# Patient Record
Sex: MALE | Race: WHITE | Employment: OTHER | URBAN - METROPOLITAN AREA
[De-identification: names, ages, dates, MRNs, and addresses within clinical notes are randomized per-mention and may not be internally consistent; named-entity substitution may affect disease eponyms.]

---

## 2018-02-20 ENCOUNTER — OFFICE VISIT (OUTPATIENT)
Dept: OPHTHALMOLOGY | Facility: CLINIC | Age: 58
End: 2018-02-20
Attending: OPHTHALMOLOGY
Payer: COMMERCIAL

## 2018-02-20 DIAGNOSIS — Z98.890 H/O LASER ASSISTED IN SITU KERATOMILEUSIS: ICD-10-CM

## 2018-02-20 DIAGNOSIS — H17.9 CORNEAL SCAR, RIGHT EYE: ICD-10-CM

## 2018-02-20 DIAGNOSIS — H25.13 AGE-RELATED NUCLEAR CATARACT OF BOTH EYES: ICD-10-CM

## 2018-02-20 DIAGNOSIS — H04.123 DRY EYES, BILATERAL: ICD-10-CM

## 2018-02-20 DIAGNOSIS — E08.3293 MILD NONPROLIFERATIVE DIABETIC RETINOPATHY OF BOTH EYES WITHOUT MACULAR EDEMA ASSOCIATED WITH DIABETES MELLITUS DUE TO UNDERLYING CONDITION (H): Primary | ICD-10-CM

## 2018-02-20 PROCEDURE — G0463 HOSPITAL OUTPT CLINIC VISIT: HCPCS | Mod: ZF

## 2018-02-20 ASSESSMENT — EXTERNAL EXAM - LEFT EYE: OS_EXAM: NORMAL

## 2018-02-20 ASSESSMENT — VISUAL ACUITY
METHOD: SNELLEN - LINEAR
OS_SC+: -2
OS_SC: 20/30
OD_PH_SC: 20/25-2
OS_PH_SC: 20/25+1
OD_SC: 20/40
OD_SC+: -2

## 2018-02-20 ASSESSMENT — SLIT LAMP EXAM - LIDS
COMMENTS: NORMAL
COMMENTS: NORMAL

## 2018-02-20 ASSESSMENT — TONOMETRY
IOP_METHOD: TONOPEN
OS_IOP_MMHG: 18
OD_IOP_MMHG: 16

## 2018-02-20 ASSESSMENT — EXTERNAL EXAM - RIGHT EYE: OD_EXAM: NORMAL

## 2018-02-20 ASSESSMENT — CONF VISUAL FIELD
OD_NORMAL: 1
OS_NORMAL: 1

## 2018-02-20 ASSESSMENT — CUP TO DISC RATIO
OD_RATIO: 0.4
OS_RATIO: 0.35

## 2018-02-20 NOTE — LETTER
2/20/2018       RE: Maurice Quiñones  [address] [facility]  [place] [address]     Dear Colleague,    Thank you for referring your patient, Maurice Quiñones, to the EYE CLINIC at [facility]. Please see a copy of my visit note below.    HPI  Maurice Quiñones is a 58 year old male with h/o laser in situ keratomileusis (LASIK) both eyes ~5-6 years ago and diabetes mellitus II here for diabetic annual eye exam. States that his vision changes with elevations in his blood sugar. No flashes/floaters.     Uses AT ointment at night as needed.    Medical History:  Type II DM    Ocular History:  laser in situ keratomileusis (LASIK) ~5-6 years ago both eyes    Assessment & Plan    1. Diabetes Mellitus II   Most recent A1c was 9.2 11/2017   Has mild nonproliferative diabetic retinopathy both eyes   On insulin and metformin   Recommend good blood sugar, pressure, lipid control    2. Refractive error   Patient has mild myopia and astigmatism both eyes   He is satisfied with 20/40, 20/30 both eyes without glasses, and states it has been that way for the past several years.   -No new prescription provided today.   -Recommend reading glasses     3. H/o laser in situ keratomileusis (LASIK)   Flaps in good position.   -Monitor.    4. Dry eyes, both eyes   H/o laser in situ keratomileusis (LASIK)   Wears cpap which exacerbates his dry eyes.   Recommend artificial tears as needed throughout the day, ointment at night or tranquil eyes goggles. Patient will try artificial tears and ointment.     5. Corneal scar OD   New, patient thinks he may have gotten some boiler material during work in his eye last summer   Mid-periphery at 4 o'clock not in visual axis   Reminded to wear eye protection at work     Patient disposition:   Return in about 1 year (around 2/20/2019) for Annual Visit. or sooner as needed.    Giovanny Lanier M.D.  PGY-2, Ophthalmology      Attending Physician Attestation:   Complete documentation of historical and exam elements from today's encounter can be found in the full encounter summary report (not reduplicated in this progress note).  I personally obtained the chief complaint(s) and history of present illness.  I confirmed and edited as necessary the review of systems, past medical/surgical history, family history, social history, and examination findings as documented by others; and I examined the patient myself.  I personally reviewed the relevant tests, images, and reports as documented above.  I formulated and edited as necessary the assessment and plan and discussed the findings and management plan with the patient and family. . - Evert Reagan MD       Again, thank you for allowing me to participate in the care of your patient.      Sincerely,    Evert Reagan MD

## 2018-02-20 NOTE — MR AVS SNAPSHOT
After Visit Summary   2/20/2018    Maurice Quiñones    MRN: [id number]           Patient Information     Date Of Birth          1960        Visit Information        Provider Department      2/20/2018 2:30 PM Evert Reagan MD [facility]        Today's Diagnoses     Mild nonproliferative diabetic retinopathy of both eyes without macular edema associated with diabetes mellitus due to underlying condition (H) - Both Eyes    -  1    Dry eyes, bilateral - Both Eyes        Corneal scar, right eye        H/O laser assisted in situ keratomileusis - Both Eyes        Age-related nuclear cataract of both eyes           Follow-ups after your visit        Follow-up notes from your care team     Return in about 1 year (around 2/20/2019) for Annual Visit.      Who to contact     Please call your clinic at [phone] to:    Ask questions about your health    Make or cancel appointments    Discuss your medicines    Learn about your test results    Speak to your doctor            Additional Information About Your Visit        MyChart Information     AudioName gives you secure access to your electronic health record. If you see a primary care provider, you can also send messages to your care team and make appointments. If you have questions, please call your primary care clinic.  If you do not have a primary care provider, please call [phone] and they will assist you.      AudioName is an electronic gateway that provides easy, online access to your medical records. With AudioName, you can request a clinic appointment, read your test results, renew a prescription or communicate with your care team.     To access your existing account, please contact your [facility] or call [phone] for assistance.        Care EveryWhere ID     This is your Care EveryWhere ID. This could be used by other organizations to access your [place] medical records  [id number]         Blood  Pressure from Last 3 Encounters:   No data found for BP    Weight from Last 3 Encounters:   No data found for Wt              Today, you had the following     No orders found for display         Today's Medication Changes          These changes are accurate as of 2/20/18  3:49 PM.  If you have any questions, ask your nurse or doctor.               These medicines have changed or have updated prescriptions.        Dose/Directions    insulin reg HIGH CONC 500 Units/mL injection   Commonly known as:  HumuLIN R U-500 HIGH CONC   This may have changed:  Another medication with the same name was removed. Continue taking this medication, and follow the directions you see here.   Changed by:  Evert Reagan MD        Inject Subcutaneous 2 times daily Dose being adjusted   Refills:  0                Primary Care Provider Office Phone # Fax #    Wu Phillips [phone]       [address] [facility] [address]        Equal Access to Services     [facility]: Hadii aad ku hadasho Soomaali, waaxda luqadaha, qaybta kaalmada adeegyada, lulu gein hayteresan darren cordero . So [facility] [phone].    ATENCIÓN: Si habla español, tiene a meléndez disposición servicios gratuitos de asistencia lingüística. Llame al [phone].    We comply with applicable federal civil rights laws and [place] laws. We do not discriminate on the basis of race, color, national origin, age, disability, sex, sexual orientation, or gender identity.            Thank you!     Thank you for choosing EYE CLINIC  for your care. Our goal is always to provide you with excellent care. Hearing back from our patients is one way we can continue to improve our services. Please take a few minutes to complete the written survey that you may receive in the mail after your visit with us. Thank you!             Your Updated Medication List - Protect others around you: Learn how to safely use, store and throw away your medicines at  www.disposemymeds.org.          This list is accurate as of 2/20/18  3:49 PM.  Always use your most recent med list.                   Brand Name Dispense Instructions for use Diagnosis    ANIYAH-E PO      Take 1 tablet by mouth daily        fenofibrate 160 MG tablet      Take 1 tablet by mouth daily        gabapentin 300 MG capsule    NEURONTIN     Take 300 mg by mouth 3 times daily        insulin reg HIGH CONC 500 Units/mL injection    HumuLIN R U-500 HIGH CONC     Inject Subcutaneous 2 times daily Dose being adjusted        LEVOTHYROXINE SODIUM PO      Take 1 tablet by mouth daily        lisinopril 5 MG tablet    PRINIVIL/ZESTRIL     Take 5 mg by mouth daily        metFORMIN 1000 MG tablet    GLUCOPHAGE     Take 1,000 mg by mouth daily (with breakfast)        pioglitazone 45 MG tablet    ACTOS     Take 1 tablet by mouth daily        PSYLLIUM HUSK PO      Take 1 tablet by mouth daily        simvastatin 80 MG tablet    ZOCOR     Take 1 tablet by mouth daily        vitamin D 2000 UNITS Caps      Take 1 tablet by mouth daily

## 2018-02-20 NOTE — NURSING NOTE
Chief Complaints and History of Present Illnesses   Patient presents with     Yearly Exam     diabetic eye exam     HPI    Affected eye(s):  Both   Symptoms:     No decreased vision   No floaters   No flashes   Dryness      Duration:  2 years   Frequency:  Constant       Do you have eye pain now?:  No      Comments:  Pt here for annual diabetic eye exam  Pt states vision is dependent on where his blood sugars are  Pt had bilateral LASIK about 8 years ago    Pt states he uses a salve at night but notes his eyes get dry - thinks related to the CPAP    Fasting BS usually runs around 150  Pt reports most recent A1c was 9.2    Deysi Mena COA 2:43 PM February 20, 2018

## 2018-02-20 NOTE — PROGRESS NOTES
HPI  Maurice Quiñones is a 58 year old male with h/o laser in situ keratomileusis (LASIK) both eyes ~5-6 years ago and diabetes mellitus II here for diabetic annual eye exam. States that his vision changes with elevations in his blood sugar. No flashes/floaters.     Uses AT ointment at night as needed.    Medical History:  Type II DM    Ocular History:  laser in situ keratomileusis (LASIK) ~5-6 years ago both eyes    Assessment & Plan    1. Diabetes Mellitus II   Most recent A1c was 9.2 11/2017   Has mild nonproliferative diabetic retinopathy both eyes   On insulin and metformin   Recommend good blood sugar, pressure, lipid control    2. Refractive error   Patient has mild myopia and astigmatism both eyes   He is satisfied with 20/40, 20/30 both eyes without glasses, and states it has been that way for the past several years.   -No new prescription provided today.   -Recommend reading glasses     3. H/o laser in situ keratomileusis (LASIK)   Flaps in good position.   -Monitor.    4. Dry eyes, both eyes   H/o laser in situ keratomileusis (LASIK)   Wears cpap which exacerbates his dry eyes.   Recommend artificial tears as needed throughout the day, ointment at night or tranquil eyes goggles. Patient will try artificial tears and ointment.     5. Corneal scar OD   New, patient thinks he may have gotten some boiler material during work in his eye last summer   Mid-periphery at 4 o'clock not in visual axis   Reminded to wear eye protection at work     Patient disposition:   Return in about 1 year (around 2/20/2019) for Annual Visit. or sooner as needed.    Giovanny Lanier M.D.  PGY-2, Ophthalmology      Attending Physician Attestation:  Complete documentation of historical and exam elements from today's encounter can be found in the full encounter summary report (not reduplicated in this progress note).  I personally obtained the chief complaint(s) and history of present illness.  I confirmed and edited as necessary  the review of systems, past medical/surgical history, family history, social history, and examination findings as documented by others; and I examined the patient myself.  I personally reviewed the relevant tests, images, and reports as documented above.  I formulated and edited as necessary the assessment and plan and discussed the findings and management plan with the patient and family. . - Evert Reagan MD

## 2019-01-19 ENCOUNTER — ANESTHESIA EVENT (OUTPATIENT)
Dept: SURGERY | Facility: CLINIC | Age: 59
DRG: 336 | End: 2019-01-19
Payer: COMMERCIAL

## 2019-01-19 ENCOUNTER — HOSPITAL ENCOUNTER (INPATIENT)
Facility: CLINIC | Age: 59
LOS: 3 days | Discharge: HOME | DRG: 336 | End: 2019-01-23
Attending: EMERGENCY MEDICINE | Admitting: SURGERY
Payer: COMMERCIAL

## 2019-01-19 ENCOUNTER — ANESTHESIA (OUTPATIENT)
Dept: SURGERY | Facility: CLINIC | Age: 59
DRG: 336 | End: 2019-01-19
Payer: COMMERCIAL

## 2019-01-19 ENCOUNTER — APPOINTMENT (OUTPATIENT)
Dept: CT IMAGING | Facility: CLINIC | Age: 59
DRG: 336 | End: 2019-01-19
Payer: COMMERCIAL

## 2019-01-19 DIAGNOSIS — K43.6 INCARCERATED VENTRAL HERNIA: ICD-10-CM

## 2019-01-19 DIAGNOSIS — K43.6 VENTRAL HERNIA WITH OBSTRUCTION: ICD-10-CM

## 2019-01-19 DIAGNOSIS — G89.18 ACUTE POST-OPERATIVE PAIN: ICD-10-CM

## 2019-01-19 DIAGNOSIS — K43.6 STRANGULATED VENTRAL HERNIA: Primary | ICD-10-CM

## 2019-01-19 LAB
ABO + RH BLD: NORMAL
ABO + RH BLD: NORMAL
ALBUMIN SERPL-MCNC: 4.4 G/DL (ref 3.4–5)
ALP SERPL-CCNC: 53 U/L (ref 40–150)
ALT SERPL W P-5'-P-CCNC: 31 U/L (ref 0–70)
ANION GAP SERPL CALCULATED.3IONS-SCNC: 12 MMOL/L (ref 3–14)
AST SERPL W P-5'-P-CCNC: 14 U/L (ref 0–45)
BASOPHILS # BLD AUTO: 0 10E9/L (ref 0–0.2)
BASOPHILS NFR BLD AUTO: 0.2 %
BILIRUB SERPL-MCNC: 0.5 MG/DL (ref 0.2–1.3)
BLD GP AB SCN SERPL QL: NORMAL
BLOOD BANK CMNT PATIENT-IMP: NORMAL
BUN SERPL-MCNC: 34 MG/DL (ref 7–30)
CALCIUM SERPL-MCNC: 10.3 MG/DL (ref 8.5–10.1)
CHLORIDE SERPL-SCNC: 103 MMOL/L (ref 94–109)
CO2 SERPL-SCNC: 23 MMOL/L (ref 20–32)
CREAT SERPL-MCNC: 1.4 MG/DL (ref 0.66–1.25)
DIFFERENTIAL METHOD BLD: ABNORMAL
EOSINOPHIL # BLD AUTO: 0.1 10E9/L (ref 0–0.7)
EOSINOPHIL NFR BLD AUTO: 0.4 %
ERYTHROCYTE [DISTWIDTH] IN BLOOD BY AUTOMATED COUNT: 13.7 % (ref 10–15)
GFR SERPL CREATININE-BSD FRML MDRD: 55 ML/MIN/{1.73_M2}
GLUCOSE SERPL-MCNC: 231 MG/DL (ref 70–99)
HCT VFR BLD AUTO: 43.9 % (ref 40–53)
HGB BLD-MCNC: 14.9 G/DL (ref 13.3–17.7)
IMM GRANULOCYTES # BLD: 0.1 10E9/L (ref 0–0.4)
IMM GRANULOCYTES NFR BLD: 0.3 %
INR PPP: 1 (ref 0.86–1.14)
LACTATE BLD-SCNC: 3.4 MMOL/L (ref 0.7–2)
LIPASE SERPL-CCNC: 50 U/L (ref 73–393)
LYMPHOCYTES # BLD AUTO: 1.4 10E9/L (ref 0.8–5.3)
LYMPHOCYTES NFR BLD AUTO: 7.9 %
MCH RBC QN AUTO: 30 PG (ref 26.5–33)
MCHC RBC AUTO-ENTMCNC: 33.9 G/DL (ref 31.5–36.5)
MCV RBC AUTO: 89 FL (ref 78–100)
MONOCYTES # BLD AUTO: 0.7 10E9/L (ref 0–1.3)
MONOCYTES NFR BLD AUTO: 3.8 %
NEUTROPHILS # BLD AUTO: 15.2 10E9/L (ref 1.6–8.3)
NEUTROPHILS NFR BLD AUTO: 87.4 %
NRBC # BLD AUTO: 0 10*3/UL
NRBC BLD AUTO-RTO: 0 /100
PLATELET # BLD AUTO: 303 10E9/L (ref 150–450)
POTASSIUM SERPL-SCNC: 4.2 MMOL/L (ref 3.4–5.3)
PROT SERPL-MCNC: 8.5 G/DL (ref 6.8–8.8)
RBC # BLD AUTO: 4.96 10E12/L (ref 4.4–5.9)
SODIUM SERPL-SCNC: 138 MMOL/L (ref 133–144)
SPECIMEN EXP DATE BLD: NORMAL
WBC # BLD AUTO: 17.4 10E9/L (ref 4–11)

## 2019-01-19 PROCEDURE — 99254 IP/OBS CNSLTJ NEW/EST MOD 60: CPT | Mod: 57 | Performed by: SURGERY

## 2019-01-19 PROCEDURE — 71000013 ZZH RECOVERY PHASE 1 LEVEL 1 EA ADDTL HR: Performed by: SURGERY

## 2019-01-19 PROCEDURE — 37000008 ZZH ANESTHESIA TECHNICAL FEE, 1ST 30 MIN: Performed by: SURGERY

## 2019-01-19 PROCEDURE — 86901 BLOOD TYPING SEROLOGIC RH(D): CPT | Performed by: EMERGENCY MEDICINE

## 2019-01-19 PROCEDURE — 37000009 ZZH ANESTHESIA TECHNICAL FEE, EACH ADDTL 15 MIN: Performed by: SURGERY

## 2019-01-19 PROCEDURE — 25000128 H RX IP 250 OP 636: Performed by: EMERGENCY MEDICINE

## 2019-01-19 PROCEDURE — 83690 ASSAY OF LIPASE: CPT | Performed by: EMERGENCY MEDICINE

## 2019-01-19 PROCEDURE — 49561 ZZHC REPAIR INITIAL INCISIONAL HERNIA; INCARCERATED OR STRANGULATED: CPT | Mod: AS | Performed by: PHYSICIAN ASSISTANT

## 2019-01-19 PROCEDURE — 96361 HYDRATE IV INFUSION ADD-ON: CPT

## 2019-01-19 PROCEDURE — 27210794 ZZH OR GENERAL SUPPLY STERILE: Performed by: SURGERY

## 2019-01-19 PROCEDURE — 71000012 ZZH RECOVERY PHASE 1 LEVEL 1 FIRST HR: Performed by: SURGERY

## 2019-01-19 PROCEDURE — 86900 BLOOD TYPING SEROLOGIC ABO: CPT | Performed by: EMERGENCY MEDICINE

## 2019-01-19 PROCEDURE — 85610 PROTHROMBIN TIME: CPT | Performed by: EMERGENCY MEDICINE

## 2019-01-19 PROCEDURE — 96376 TX/PRO/DX INJ SAME DRUG ADON: CPT

## 2019-01-19 PROCEDURE — 49561 ZZHC REPAIR INITIAL INCISIONAL HERNIA; INCARCERATED OR STRANGULATED: CPT | Performed by: SURGERY

## 2019-01-19 PROCEDURE — 36000056 ZZH SURGERY LEVEL 3 1ST 30 MIN: Performed by: SURGERY

## 2019-01-19 PROCEDURE — 86850 RBC ANTIBODY SCREEN: CPT | Performed by: EMERGENCY MEDICINE

## 2019-01-19 PROCEDURE — 96375 TX/PRO/DX INJ NEW DRUG ADDON: CPT

## 2019-01-19 PROCEDURE — 83605 ASSAY OF LACTIC ACID: CPT | Performed by: EMERGENCY MEDICINE

## 2019-01-19 PROCEDURE — 85025 COMPLETE CBC W/AUTO DIFF WBC: CPT | Performed by: EMERGENCY MEDICINE

## 2019-01-19 PROCEDURE — 74177 CT ABD & PELVIS W/CONTRAST: CPT

## 2019-01-19 PROCEDURE — 36000058 ZZH SURGERY LEVEL 3 EA 15 ADDTL MIN: Performed by: SURGERY

## 2019-01-19 PROCEDURE — 49568 ZZHC REPAIR HERNIA WITH MESH: CPT | Performed by: SURGERY

## 2019-01-19 PROCEDURE — 25000125 ZZHC RX 250: Performed by: NURSE ANESTHETIST, CERTIFIED REGISTERED

## 2019-01-19 PROCEDURE — 25000128 H RX IP 250 OP 636: Performed by: NURSE ANESTHETIST, CERTIFIED REGISTERED

## 2019-01-19 PROCEDURE — 25000566 ZZH SEVOFLURANE, EA 15 MIN: Performed by: SURGERY

## 2019-01-19 PROCEDURE — 40000170 ZZH STATISTIC PRE-PROCEDURE ASSESSMENT II: Performed by: SURGERY

## 2019-01-19 PROCEDURE — 99285 EMERGENCY DEPT VISIT HI MDM: CPT | Mod: 25

## 2019-01-19 PROCEDURE — 80053 COMPREHEN METABOLIC PANEL: CPT | Performed by: EMERGENCY MEDICINE

## 2019-01-19 PROCEDURE — 96365 THER/PROPH/DIAG IV INF INIT: CPT

## 2019-01-19 PROCEDURE — 49568 ZZHC REPAIR HERNIA WITH MESH: CPT | Mod: AS | Performed by: PHYSICIAN ASSISTANT

## 2019-01-19 PROCEDURE — C1781 MESH (IMPLANTABLE): HCPCS | Performed by: SURGERY

## 2019-01-19 RX ORDER — IOPAMIDOL 755 MG/ML
135 INJECTION, SOLUTION INTRAVASCULAR ONCE
Status: COMPLETED | OUTPATIENT
Start: 2019-01-19 | End: 2019-01-19

## 2019-01-19 RX ORDER — HYDROMORPHONE HYDROCHLORIDE 1 MG/ML
0.5 INJECTION, SOLUTION INTRAMUSCULAR; INTRAVENOUS; SUBCUTANEOUS
Status: DISCONTINUED | OUTPATIENT
Start: 2019-01-19 | End: 2019-01-20

## 2019-01-19 RX ORDER — PIOGLITAZONEHYDROCHLORIDE 30 MG/1
30 TABLET ORAL DAILY
COMMUNITY

## 2019-01-19 RX ORDER — PIPERACILLIN SODIUM, TAZOBACTAM SODIUM 4; .5 G/20ML; G/20ML
4.5 INJECTION, POWDER, LYOPHILIZED, FOR SOLUTION INTRAVENOUS ONCE
Status: DISCONTINUED | OUTPATIENT
Start: 2019-01-19 | End: 2019-01-19

## 2019-01-19 RX ORDER — PIPERACILLIN SODIUM, TAZOBACTAM SODIUM 4; .5 G/20ML; G/20ML
4.5 INJECTION, POWDER, LYOPHILIZED, FOR SOLUTION INTRAVENOUS ONCE
Status: COMPLETED | OUTPATIENT
Start: 2019-01-19 | End: 2019-01-19

## 2019-01-19 RX ORDER — ONDANSETRON 2 MG/ML
4 INJECTION INTRAMUSCULAR; INTRAVENOUS ONCE
Status: COMPLETED | OUTPATIENT
Start: 2019-01-19 | End: 2019-01-19

## 2019-01-19 RX ORDER — LEVOTHYROXINE SODIUM 75 UG/1
75 TABLET ORAL DAILY
COMMUNITY

## 2019-01-19 RX ORDER — GABAPENTIN 300 MG/1
700 CAPSULE ORAL EVERY EVENING
COMMUNITY

## 2019-01-19 RX ADMIN — LIDOCAINE HYDROCHLORIDE 100 MG: 20 INJECTION, SOLUTION INFILTRATION; PERINEURAL at 23:55

## 2019-01-19 RX ADMIN — PROPOFOL 200 MG: 10 INJECTION, EMULSION INTRAVENOUS at 23:55

## 2019-01-19 RX ADMIN — MIDAZOLAM 2 MG: 1 INJECTION INTRAMUSCULAR; INTRAVENOUS at 23:47

## 2019-01-19 RX ADMIN — SODIUM CHLORIDE 80 ML: 9 INJECTION, SOLUTION INTRAVENOUS at 22:16

## 2019-01-19 RX ADMIN — SUCCINYLCHOLINE CHLORIDE 100 MG: 20 INJECTION, SOLUTION INTRAMUSCULAR; INTRAVENOUS; PARENTERAL at 23:55

## 2019-01-19 RX ADMIN — FENTANYL CITRATE 50 MCG: 50 INJECTION, SOLUTION INTRAMUSCULAR; INTRAVENOUS at 23:51

## 2019-01-19 RX ADMIN — Medication 0.5 MG: at 22:36

## 2019-01-19 RX ADMIN — SODIUM CHLORIDE 1000 ML: 9 INJECTION, SOLUTION INTRAVENOUS at 21:57

## 2019-01-19 RX ADMIN — IOPAMIDOL 135 ML: 755 INJECTION, SOLUTION INTRAVENOUS at 22:16

## 2019-01-19 RX ADMIN — PIPERACILLIN AND TAZOBACTAM 4.5 G: 4; .5 INJECTION, POWDER, FOR SOLUTION INTRAVENOUS at 22:41

## 2019-01-19 RX ADMIN — Medication 0.5 MG: at 22:01

## 2019-01-19 RX ADMIN — ONDANSETRON 4 MG: 2 INJECTION INTRAMUSCULAR; INTRAVENOUS at 22:37

## 2019-01-19 RX ADMIN — SODIUM CHLORIDE, POTASSIUM CHLORIDE, SODIUM LACTATE AND CALCIUM CHLORIDE: 600; 310; 30; 20 INJECTION, SOLUTION INTRAVENOUS at 23:33

## 2019-01-19 ASSESSMENT — ENCOUNTER SYMPTOMS
ABDOMINAL PAIN: 1
VOMITING: 1
CHILLS: 0
FEVER: 0
NAUSEA: 1

## 2019-01-19 ASSESSMENT — LIFESTYLE VARIABLES: TOBACCO_USE: 1

## 2019-01-19 ASSESSMENT — MIFFLIN-ST. JEOR: SCORE: 2136.68

## 2019-01-20 PROBLEM — K43.6 INCARCERATED VENTRAL HERNIA: Status: ACTIVE | Noted: 2019-01-20

## 2019-01-20 LAB
CREAT SERPL-MCNC: 1 MG/DL (ref 0.66–1.25)
GFR SERPL CREATININE-BSD FRML MDRD: 82 ML/MIN/{1.73_M2}
GLUCOSE BLDC GLUCOMTR-MCNC: 214 MG/DL (ref 70–99)
GLUCOSE BLDC GLUCOMTR-MCNC: 240 MG/DL (ref 70–99)
GLUCOSE BLDC GLUCOMTR-MCNC: 279 MG/DL (ref 70–99)
GLUCOSE BLDC GLUCOMTR-MCNC: 290 MG/DL (ref 70–99)
GLUCOSE BLDC GLUCOMTR-MCNC: 339 MG/DL (ref 70–99)
GLUCOSE BLDC GLUCOMTR-MCNC: 367 MG/DL (ref 70–99)
HBA1C MFR BLD: 9.2 % (ref 0–5.6)
LACTATE BLD-SCNC: 1.5 MMOL/L (ref 0.7–2)
PLATELET # BLD AUTO: 294 10E9/L (ref 150–450)

## 2019-01-20 PROCEDURE — 12000000 ZZH R&B MED SURG/OB

## 2019-01-20 PROCEDURE — 83036 HEMOGLOBIN GLYCOSYLATED A1C: CPT | Performed by: PHYSICIAN ASSISTANT

## 2019-01-20 PROCEDURE — 00000146 ZZHCL STATISTIC GLUCOSE BY METER IP

## 2019-01-20 PROCEDURE — 25000128 H RX IP 250 OP 636: Performed by: ANESTHESIOLOGY

## 2019-01-20 PROCEDURE — 85049 AUTOMATED PLATELET COUNT: CPT | Performed by: PHYSICIAN ASSISTANT

## 2019-01-20 PROCEDURE — 82565 ASSAY OF CREATININE: CPT | Performed by: PHYSICIAN ASSISTANT

## 2019-01-20 PROCEDURE — 0WUF0JZ SUPPLEMENT ABDOMINAL WALL WITH SYNTHETIC SUBSTITUTE, OPEN APPROACH: ICD-10-PCS | Performed by: SURGERY

## 2019-01-20 PROCEDURE — 25000125 ZZHC RX 250: Performed by: SURGERY

## 2019-01-20 PROCEDURE — 25000128 H RX IP 250 OP 636: Performed by: PHYSICIAN ASSISTANT

## 2019-01-20 PROCEDURE — 25000131 ZZH RX MED GY IP 250 OP 636 PS 637: Performed by: INTERNAL MEDICINE

## 2019-01-20 PROCEDURE — 99207 ZZC CONSULT E&M CHANGED TO INITIAL LEVEL: CPT | Performed by: INTERNAL MEDICINE

## 2019-01-20 PROCEDURE — 25000125 ZZHC RX 250: Performed by: NURSE ANESTHETIST, CERTIFIED REGISTERED

## 2019-01-20 PROCEDURE — 25000128 H RX IP 250 OP 636: Performed by: NURSE ANESTHETIST, CERTIFIED REGISTERED

## 2019-01-20 PROCEDURE — 0DNW0ZZ RELEASE PERITONEUM, OPEN APPROACH: ICD-10-PCS | Performed by: SURGERY

## 2019-01-20 PROCEDURE — 99222 1ST HOSP IP/OBS MODERATE 55: CPT | Performed by: INTERNAL MEDICINE

## 2019-01-20 PROCEDURE — 25000132 ZZH RX MED GY IP 250 OP 250 PS 637: Performed by: PHYSICIAN ASSISTANT

## 2019-01-20 PROCEDURE — 83605 ASSAY OF LACTIC ACID: CPT | Performed by: SURGERY

## 2019-01-20 PROCEDURE — 25000132 ZZH RX MED GY IP 250 OP 250 PS 637: Performed by: INTERNAL MEDICINE

## 2019-01-20 PROCEDURE — 36415 COLL VENOUS BLD VENIPUNCTURE: CPT | Performed by: PHYSICIAN ASSISTANT

## 2019-01-20 DEVICE — MESH SYMBOTEX COMPOSITE STEX 25CM X 20CM SYM2520: Type: IMPLANTABLE DEVICE | Site: ABDOMEN | Status: FUNCTIONAL

## 2019-01-20 RX ORDER — PROCHLORPERAZINE MALEATE 5 MG
10 TABLET ORAL EVERY 6 HOURS PRN
Status: DISCONTINUED | OUTPATIENT
Start: 2019-01-20 | End: 2019-01-23 | Stop reason: HOSPADM

## 2019-01-20 RX ORDER — NALOXONE HYDROCHLORIDE 0.4 MG/ML
.1-.4 INJECTION, SOLUTION INTRAMUSCULAR; INTRAVENOUS; SUBCUTANEOUS
Status: DISCONTINUED | OUTPATIENT
Start: 2019-01-20 | End: 2019-01-23 | Stop reason: HOSPADM

## 2019-01-20 RX ORDER — LIDOCAINE 40 MG/G
CREAM TOPICAL
Status: DISCONTINUED | OUTPATIENT
Start: 2019-01-20 | End: 2019-01-23 | Stop reason: HOSPADM

## 2019-01-20 RX ORDER — DIPHENHYDRAMINE HCL 25 MG
25 CAPSULE ORAL EVERY 6 HOURS PRN
Status: DISCONTINUED | OUTPATIENT
Start: 2019-01-20 | End: 2019-01-23 | Stop reason: HOSPADM

## 2019-01-20 RX ORDER — ONDANSETRON 4 MG/1
4 TABLET, ORALLY DISINTEGRATING ORAL EVERY 30 MIN PRN
Status: DISCONTINUED | OUTPATIENT
Start: 2019-01-20 | End: 2019-01-20 | Stop reason: HOSPADM

## 2019-01-20 RX ORDER — DEXTROSE MONOHYDRATE 25 G/50ML
25-50 INJECTION, SOLUTION INTRAVENOUS
Status: DISCONTINUED | OUTPATIENT
Start: 2019-01-20 | End: 2019-01-22

## 2019-01-20 RX ORDER — LEVOTHYROXINE SODIUM 75 UG/1
75 TABLET ORAL DAILY
Status: DISCONTINUED | OUTPATIENT
Start: 2019-01-20 | End: 2019-01-23 | Stop reason: HOSPADM

## 2019-01-20 RX ORDER — BUPIVACAINE HYDROCHLORIDE AND EPINEPHRINE 2.5; 5 MG/ML; UG/ML
INJECTION, SOLUTION INFILTRATION; PERINEURAL PRN
Status: DISCONTINUED | OUTPATIENT
Start: 2019-01-20 | End: 2019-01-20 | Stop reason: HOSPADM

## 2019-01-20 RX ORDER — FENTANYL CITRATE 50 UG/ML
25-50 INJECTION, SOLUTION INTRAMUSCULAR; INTRAVENOUS
Status: DISCONTINUED | OUTPATIENT
Start: 2019-01-20 | End: 2019-01-20 | Stop reason: HOSPADM

## 2019-01-20 RX ORDER — ONDANSETRON 2 MG/ML
INJECTION INTRAMUSCULAR; INTRAVENOUS PRN
Status: DISCONTINUED | OUTPATIENT
Start: 2019-01-20 | End: 2019-01-20

## 2019-01-20 RX ORDER — ACETAMINOPHEN 325 MG/1
650 TABLET ORAL EVERY 4 HOURS PRN
Status: DISCONTINUED | OUTPATIENT
Start: 2019-01-23 | End: 2019-01-23 | Stop reason: HOSPADM

## 2019-01-20 RX ORDER — HYDRALAZINE HYDROCHLORIDE 20 MG/ML
10 INJECTION INTRAMUSCULAR; INTRAVENOUS EVERY 4 HOURS PRN
Status: DISCONTINUED | OUTPATIENT
Start: 2019-01-20 | End: 2019-01-23 | Stop reason: HOSPADM

## 2019-01-20 RX ORDER — GABAPENTIN 300 MG/1
300 CAPSULE ORAL EVERY MORNING
Status: DISCONTINUED | OUTPATIENT
Start: 2019-01-20 | End: 2019-01-23 | Stop reason: HOSPADM

## 2019-01-20 RX ORDER — PROPOFOL 10 MG/ML
INJECTION, EMULSION INTRAVENOUS CONTINUOUS PRN
Status: DISCONTINUED | OUTPATIENT
Start: 2019-01-20 | End: 2019-01-20

## 2019-01-20 RX ORDER — GLYCOPYRROLATE 0.2 MG/ML
INJECTION, SOLUTION INTRAMUSCULAR; INTRAVENOUS PRN
Status: DISCONTINUED | OUTPATIENT
Start: 2019-01-20 | End: 2019-01-20

## 2019-01-20 RX ORDER — LIDOCAINE HYDROCHLORIDE 20 MG/ML
INJECTION, SOLUTION INFILTRATION; PERINEURAL PRN
Status: DISCONTINUED | OUTPATIENT
Start: 2019-01-19 | End: 2019-01-20

## 2019-01-20 RX ORDER — NEOSTIGMINE METHYLSULFATE 1 MG/ML
VIAL (ML) INJECTION PRN
Status: DISCONTINUED | OUTPATIENT
Start: 2019-01-20 | End: 2019-01-20

## 2019-01-20 RX ORDER — OXYCODONE HYDROCHLORIDE 5 MG/1
5-10 TABLET ORAL
Status: DISCONTINUED | OUTPATIENT
Start: 2019-01-20 | End: 2019-01-23 | Stop reason: HOSPADM

## 2019-01-20 RX ORDER — DIPHENHYDRAMINE HYDROCHLORIDE 50 MG/ML
25 INJECTION INTRAMUSCULAR; INTRAVENOUS EVERY 6 HOURS PRN
Status: DISCONTINUED | OUTPATIENT
Start: 2019-01-20 | End: 2019-01-23 | Stop reason: HOSPADM

## 2019-01-20 RX ORDER — SIMVASTATIN 40 MG
80 TABLET ORAL AT BEDTIME
Status: DISCONTINUED | OUTPATIENT
Start: 2019-01-20 | End: 2019-01-23 | Stop reason: HOSPADM

## 2019-01-20 RX ORDER — PROPOFOL 10 MG/ML
INJECTION, EMULSION INTRAVENOUS PRN
Status: DISCONTINUED | OUTPATIENT
Start: 2019-01-19 | End: 2019-01-20

## 2019-01-20 RX ORDER — ACETAMINOPHEN 325 MG/1
975 TABLET ORAL EVERY 8 HOURS
Status: DISPENSED | OUTPATIENT
Start: 2019-01-20 | End: 2019-01-22

## 2019-01-20 RX ORDER — SODIUM CHLORIDE, SODIUM LACTATE, POTASSIUM CHLORIDE, CALCIUM CHLORIDE 600; 310; 30; 20 MG/100ML; MG/100ML; MG/100ML; MG/100ML
INJECTION, SOLUTION INTRAVENOUS CONTINUOUS
Status: DISCONTINUED | OUTPATIENT
Start: 2019-01-20 | End: 2019-01-20 | Stop reason: HOSPADM

## 2019-01-20 RX ORDER — ONDANSETRON 2 MG/ML
4 INJECTION INTRAMUSCULAR; INTRAVENOUS EVERY 30 MIN PRN
Status: DISCONTINUED | OUTPATIENT
Start: 2019-01-20 | End: 2019-01-20 | Stop reason: HOSPADM

## 2019-01-20 RX ORDER — NICOTINE POLACRILEX 4 MG
15-30 LOZENGE BUCCAL
Status: DISCONTINUED | OUTPATIENT
Start: 2019-01-20 | End: 2019-01-22

## 2019-01-20 RX ORDER — SODIUM CHLORIDE 9 MG/ML
INJECTION, SOLUTION INTRAVENOUS CONTINUOUS
Status: DISCONTINUED | OUTPATIENT
Start: 2019-01-20 | End: 2019-01-23 | Stop reason: HOSPADM

## 2019-01-20 RX ORDER — FENTANYL CITRATE 50 UG/ML
INJECTION, SOLUTION INTRAMUSCULAR; INTRAVENOUS PRN
Status: DISCONTINUED | OUTPATIENT
Start: 2019-01-19 | End: 2019-01-20

## 2019-01-20 RX ORDER — NALOXONE HYDROCHLORIDE 0.4 MG/ML
.1-.4 INJECTION, SOLUTION INTRAMUSCULAR; INTRAVENOUS; SUBCUTANEOUS
Status: DISCONTINUED | OUTPATIENT
Start: 2019-01-20 | End: 2019-01-20

## 2019-01-20 RX ORDER — SODIUM CHLORIDE, SODIUM LACTATE, POTASSIUM CHLORIDE, CALCIUM CHLORIDE 600; 310; 30; 20 MG/100ML; MG/100ML; MG/100ML; MG/100ML
INJECTION, SOLUTION INTRAVENOUS CONTINUOUS PRN
Status: DISCONTINUED | OUTPATIENT
Start: 2019-01-19 | End: 2019-01-20

## 2019-01-20 RX ORDER — ONDANSETRON 4 MG/1
4 TABLET, ORALLY DISINTEGRATING ORAL EVERY 6 HOURS PRN
Status: DISCONTINUED | OUTPATIENT
Start: 2019-01-20 | End: 2019-01-23 | Stop reason: HOSPADM

## 2019-01-20 RX ORDER — ONDANSETRON 2 MG/ML
4 INJECTION INTRAMUSCULAR; INTRAVENOUS EVERY 6 HOURS PRN
Status: DISCONTINUED | OUTPATIENT
Start: 2019-01-20 | End: 2019-01-23 | Stop reason: HOSPADM

## 2019-01-20 RX ORDER — VECURONIUM BROMIDE 1 MG/ML
INJECTION, POWDER, LYOPHILIZED, FOR SOLUTION INTRAVENOUS PRN
Status: DISCONTINUED | OUTPATIENT
Start: 2019-01-20 | End: 2019-01-20

## 2019-01-20 RX ORDER — AMOXICILLIN 250 MG
2 CAPSULE ORAL 2 TIMES DAILY
Status: DISCONTINUED | OUTPATIENT
Start: 2019-01-20 | End: 2019-01-23 | Stop reason: HOSPADM

## 2019-01-20 RX ORDER — LISINOPRIL 5 MG/1
5 TABLET ORAL DAILY
Status: DISCONTINUED | OUTPATIENT
Start: 2019-01-20 | End: 2019-01-23 | Stop reason: HOSPADM

## 2019-01-20 RX ORDER — AMOXICILLIN 250 MG
1 CAPSULE ORAL 2 TIMES DAILY
Status: DISCONTINUED | OUTPATIENT
Start: 2019-01-20 | End: 2019-01-23 | Stop reason: HOSPADM

## 2019-01-20 RX ORDER — EPHEDRINE SULFATE 50 MG/ML
INJECTION, SOLUTION INTRAMUSCULAR; INTRAVENOUS; SUBCUTANEOUS PRN
Status: DISCONTINUED | OUTPATIENT
Start: 2019-01-20 | End: 2019-01-20

## 2019-01-20 RX ADMIN — ACETAMINOPHEN 975 MG: 325 TABLET, FILM COATED ORAL at 16:07

## 2019-01-20 RX ADMIN — ROCURONIUM BROMIDE 50 MG: 10 INJECTION INTRAVENOUS at 00:03

## 2019-01-20 RX ADMIN — SODIUM CHLORIDE: 9 INJECTION, SOLUTION INTRAVENOUS at 13:16

## 2019-01-20 RX ADMIN — SIMVASTATIN 80 MG: 40 TABLET, FILM COATED ORAL at 21:30

## 2019-01-20 RX ADMIN — PHENYLEPHRINE HYDROCHLORIDE 100 MCG: 10 INJECTION, SOLUTION INTRAMUSCULAR; INTRAVENOUS; SUBCUTANEOUS at 00:51

## 2019-01-20 RX ADMIN — FENTANYL CITRATE 50 MCG: 50 INJECTION, SOLUTION INTRAMUSCULAR; INTRAVENOUS at 02:51

## 2019-01-20 RX ADMIN — SENNOSIDES AND DOCUSATE SODIUM 2 TABLET: 8.6; 5 TABLET ORAL at 09:07

## 2019-01-20 RX ADMIN — FENTANYL CITRATE 50 MCG: 50 INJECTION, SOLUTION INTRAMUSCULAR; INTRAVENOUS at 01:46

## 2019-01-20 RX ADMIN — PHENYLEPHRINE HYDROCHLORIDE 150 MCG: 10 INJECTION, SOLUTION INTRAMUSCULAR; INTRAVENOUS; SUBCUTANEOUS at 01:00

## 2019-01-20 RX ADMIN — GABAPENTIN 700 MG: 100 CAPSULE ORAL at 20:08

## 2019-01-20 RX ADMIN — Medication 5 MG: at 01:10

## 2019-01-20 RX ADMIN — VECURONIUM BROMIDE 1 MG: 1 INJECTION, POWDER, LYOPHILIZED, FOR SOLUTION INTRAVENOUS at 01:04

## 2019-01-20 RX ADMIN — SENNOSIDES AND DOCUSATE SODIUM 2 TABLET: 8.6; 5 TABLET ORAL at 21:30

## 2019-01-20 RX ADMIN — SODIUM CHLORIDE, POTASSIUM CHLORIDE, SODIUM LACTATE AND CALCIUM CHLORIDE: 600; 310; 30; 20 INJECTION, SOLUTION INTRAVENOUS at 01:49

## 2019-01-20 RX ADMIN — GLYCOPYRROLATE 0.4 MG: 0.2 INJECTION, SOLUTION INTRAMUSCULAR; INTRAVENOUS at 01:44

## 2019-01-20 RX ADMIN — GLYCOPYRROLATE 0.4 MG: 0.2 INJECTION, SOLUTION INTRAMUSCULAR; INTRAVENOUS at 01:50

## 2019-01-20 RX ADMIN — PROPOFOL 30 MCG/KG/MIN: 10 INJECTION, EMULSION INTRAVENOUS at 00:14

## 2019-01-20 RX ADMIN — LISINOPRIL 5 MG: 5 TABLET ORAL at 09:07

## 2019-01-20 RX ADMIN — SODIUM CHLORIDE, POTASSIUM CHLORIDE, SODIUM LACTATE AND CALCIUM CHLORIDE: 600; 310; 30; 20 INJECTION, SOLUTION INTRAVENOUS at 00:15

## 2019-01-20 RX ADMIN — FENTANYL CITRATE 25 MCG: 50 INJECTION, SOLUTION INTRAMUSCULAR; INTRAVENOUS at 01:59

## 2019-01-20 RX ADMIN — FENTANYL CITRATE 50 MCG: 50 INJECTION, SOLUTION INTRAMUSCULAR; INTRAVENOUS at 03:07

## 2019-01-20 RX ADMIN — PHENYLEPHRINE HYDROCHLORIDE 100 MCG: 10 INJECTION, SOLUTION INTRAMUSCULAR; INTRAVENOUS; SUBCUTANEOUS at 00:40

## 2019-01-20 RX ADMIN — RANITIDINE 150 MG: 150 TABLET ORAL at 06:41

## 2019-01-20 RX ADMIN — PHENYLEPHRINE HYDROCHLORIDE 100 MCG: 10 INJECTION, SOLUTION INTRAMUSCULAR; INTRAVENOUS; SUBCUTANEOUS at 00:34

## 2019-01-20 RX ADMIN — INSULIN GLARGINE 24 UNITS: 100 INJECTION, SOLUTION SUBCUTANEOUS at 21:34

## 2019-01-20 RX ADMIN — ACETAMINOPHEN 975 MG: 325 TABLET, FILM COATED ORAL at 09:07

## 2019-01-20 RX ADMIN — INSULIN GLARGINE 24 UNITS: 100 INJECTION, SOLUTION SUBCUTANEOUS at 10:54

## 2019-01-20 RX ADMIN — GABAPENTIN 300 MG: 300 CAPSULE ORAL at 09:07

## 2019-01-20 RX ADMIN — VECURONIUM BROMIDE 2 MG: 1 INJECTION, POWDER, LYOPHILIZED, FOR SOLUTION INTRAVENOUS at 00:47

## 2019-01-20 RX ADMIN — ENOXAPARIN SODIUM 40 MG: 40 INJECTION SUBCUTANEOUS at 09:08

## 2019-01-20 RX ADMIN — FENTANYL CITRATE 25 MCG: 50 INJECTION, SOLUTION INTRAMUSCULAR; INTRAVENOUS at 01:55

## 2019-01-20 RX ADMIN — FENTANYL CITRATE 50 MCG: 50 INJECTION, SOLUTION INTRAMUSCULAR; INTRAVENOUS at 02:18

## 2019-01-20 RX ADMIN — NEOSTIGMINE METHYLSULFATE 2 MG: 1 INJECTION, SOLUTION INTRAVENOUS at 01:50

## 2019-01-20 RX ADMIN — RANITIDINE 150 MG: 150 TABLET ORAL at 18:57

## 2019-01-20 RX ADMIN — FENTANYL CITRATE 50 MCG: 50 INJECTION, SOLUTION INTRAMUSCULAR; INTRAVENOUS at 00:15

## 2019-01-20 RX ADMIN — HYDROMORPHONE HYDROCHLORIDE 0.5 MG: 1 INJECTION, SOLUTION INTRAMUSCULAR; INTRAVENOUS; SUBCUTANEOUS at 00:26

## 2019-01-20 RX ADMIN — VECURONIUM BROMIDE 1 MG: 1 INJECTION, POWDER, LYOPHILIZED, FOR SOLUTION INTRAVENOUS at 01:24

## 2019-01-20 RX ADMIN — FENTANYL CITRATE 50 MCG: 50 INJECTION, SOLUTION INTRAMUSCULAR; INTRAVENOUS at 00:12

## 2019-01-20 RX ADMIN — HYDROMORPHONE HYDROCHLORIDE 0.5 MG: 1 INJECTION, SOLUTION INTRAMUSCULAR; INTRAVENOUS; SUBCUTANEOUS at 01:46

## 2019-01-20 RX ADMIN — VECURONIUM BROMIDE 2 MG: 1 INJECTION, POWDER, LYOPHILIZED, FOR SOLUTION INTRAVENOUS at 00:24

## 2019-01-20 RX ADMIN — NEOSTIGMINE METHYLSULFATE 3 MG: 1 INJECTION, SOLUTION INTRAVENOUS at 01:44

## 2019-01-20 RX ADMIN — Medication: at 02:26

## 2019-01-20 RX ADMIN — ONDANSETRON 4 MG: 2 INJECTION INTRAMUSCULAR; INTRAVENOUS at 01:39

## 2019-01-20 RX ADMIN — LEVOTHYROXINE SODIUM 75 MCG: 75 TABLET ORAL at 09:07

## 2019-01-20 RX ADMIN — FENTANYL CITRATE 50 MCG: 50 INJECTION, SOLUTION INTRAMUSCULAR; INTRAVENOUS at 02:37

## 2019-01-20 ASSESSMENT — ACTIVITIES OF DAILY LIVING (ADL)
ADLS_ACUITY_SCORE: 14
ADLS_ACUITY_SCORE: 14
ADLS_ACUITY_SCORE: 10
ADLS_ACUITY_SCORE: 14
ADLS_ACUITY_SCORE: 13

## 2019-01-20 NOTE — PROGRESS NOTES
"Surgery    Awake, Sore, tired  Has been up to chair this morning.  Pain managed appropriately.  No bowel function.  Ball in place    Gen:  Awake, Alert, NAD  Blood pressure 133/78, pulse 90, temperature 98.1  F (36.7  C), temperature source Oral, resp. rate 22, height 1.778 m (5' 10\"), weight 131.5 kg (290 lb), SpO2 95 %.  Resp - non labored    Abdomen - soft, appropriately tender, obese. Dressings clean.  Extremities - no lower extremity edema.    Drain 25cc since surg. Sero sang  Glu 290  Cre 1    A/P 58 yo man with poorly controlled DM2 and chronic recurrent large incarcerated VH, s/p combined open and laparoscopic ventral hernia repair with mesh on 1/20/19. (POD0)    - appreciate hospitalist management of medical issues.   - up to chair and walk a little today.   - continue PCA.  - lovenox for dvt ppx tonight.  - drain cares.  - sips of clears.  - binder.   - await gi function    Colten Wiggins PA-C  Office: [phone]  Pager: [phone]     "

## 2019-01-20 NOTE — PLAN OF CARE
Arrived to unit from PACU around 0330. Alert and oriented. VSS ex BP slightly elevated. Capno stable. Abdominal and lap site dressings CDI. Reports pain, managed with PCA dilaudid and cold application. Lizzy and KAILEY in place.

## 2019-01-20 NOTE — ANESTHESIA PREPROCEDURE EVALUATION
Anesthesia Pre-Procedure Evaluation    Patient: Maurice Quiñones   MRN: [id number] : 1960          Preoperative Diagnosis: INCARCERATED HERNIA    Procedure(s):  LAPAROSCOPIC ASSISTED COLECTOMY    Past Medical History:   Diagnosis Date     Diabetes (H)      Past Surgical History:   Procedure Laterality Date     ARTHROSCOPIC RECONSTRUCTION ANTERIOR AND POSTERIOR CRUCIATE LIGAMENT, COMBINED Bilateral      Left  Knee and  Right Knee     LASIK BILATERAL Bilateral     Clinic was located in [place]       Anesthesia Evaluation     .             ROS/MED HX    ENT/Pulmonary:     (+)tobacco use, , . .   (-) asthma and sleep apnea   Neurologic:      (-) CVA and TIA   Cardiovascular:  - neg cardiovascular ROS   (+) Dyslipidemia, hypertension----. : . . . :. .       METS/Exercise Tolerance:     Hematologic:         Musculoskeletal:         GI/Hepatic:     (+) GERD      (-) liver disease   Renal/Genitourinary:      (-) renal disease   Endo:     (+) type II DM Diabetic complications: retinopathy, Obesity, .      Psychiatric:         Infectious Disease:         Malignancy:         Other:                          Physical Exam      Airway   Mallampati: III  TM distance: >3 FB  Neck ROM: full    Dental   (+) chipped    Cardiovascular   Rhythm and rate: regular      Pulmonary    breath sounds clear to auscultation            Lab Results   Component Value Date    WBC 17.4 (H) 2019    HGB 14.9 2019    HCT 43.9 2019     2019     2019    POTASSIUM 4.2 2019    CHLORIDE 103 2019    CO2 23 2019    BUN 34 (H) 2019    CR 1.40 (H) 2019     (H) 2019    DELBERT 10.3 (H) 2019    ALBUMIN 4.4 2019    PROTTOTAL 8.5 2019    ALT 31 2019    AST 14 2019    ALKPHOS 53 2019    BILITOTAL 0.5 2019    LIPASE 50 (L) 2019    INR 1.00 2019       Preop Vitals  BP Readings from Last 3 Encounters:  "  01/19/19 (!) 178/91    Pulse Readings from Last 3 Encounters:   01/19/19 61      Resp Readings from Last 3 Encounters:   01/19/19 18    SpO2 Readings from Last 3 Encounters:   01/19/19 98%      Temp Readings from Last 1 Encounters:   01/19/19 36.3  C (97.4  F) (Oral)    Ht Readings from Last 1 Encounters:   01/19/19 1.778 m (5' 10\")      Wt Readings from Last 1 Encounters:   01/19/19 131.5 kg (290 lb)    Estimated body mass index is 41.61 kg/m  as calculated from the following:    Height as of this encounter: 1.778 m (5' 10\").    Weight as of this encounter: 131.5 kg (290 lb).       Anesthesia Plan      History & Physical Review  History and physical reviewed and following examination; no interval change.    ASA Status:  3 emergent.    NPO Status:  > 8 hours    Plan for General, RSI and ETT   PONV prophylaxis:  Ondansetron (or other 5HT-3)  Additional equipment: Videolaryngoscope      Postoperative Care      Consents  Anesthetic plan, risks, benefits and alternatives discussed with:  Patient..                 Holly Nicole  "

## 2019-01-20 NOTE — PROGRESS NOTES
Brief note:     Routine hospitalist consult for glycemic management received. Reviewed chart and discussed with RN. Just returned from OR for incarcerated hernia. Currently NPO, changed insulin sliding scale insulin to high protocol. Added hydralazine IV PRN for elevated blood pressures. Formal consult to follow during daytime hours unless urgent issues arise overnight.     Jm Horta MD   Hospitalist  [phone]

## 2019-01-20 NOTE — ANESTHESIA POSTPROCEDURE EVALUATION
Patient: Maurice Quiñones    Procedure(s):  OPEN REPAIR OF INCARCERATED VENTRAL HERNIA  LAPAROSCOPIC REPAIR OF INCARCERTED VENTRAL HERNIA WITH MESH    Diagnosis:INCARCERATED HERNIA  Diagnosis Additional Information: No value filed.    Anesthesia Type:  General, RSI, ETT    Note:  Anesthesia Post Evaluation    Patient location during evaluation: PACU  Patient participation: Able to fully participate in evaluation  Level of consciousness: awake, awake and alert and responsive to verbal stimuli  Pain management: adequate  Airway patency: patent  Cardiovascular status: acceptable  Respiratory status: acceptable  Hydration status: acceptable  PONV: none     Anesthetic complications: None          Last vitals:  Vitals:    01/20/19 0414 01/20/19 0525 01/20/19 0615   BP: (!) 162/92 (!) 148/92 (!) 154/91   Pulse: 98 103 101   Resp: 24 21 20   Temp: 36.5  C (97.7  F) 36.3  C (97.4  F) 36.5  C (97.7  F)   SpO2: 95% 94% 93%         Electronically Signed By: Holly Nicole  January 20, 2019  6:45 AM

## 2019-01-20 NOTE — ED PROVIDER NOTES
History     Chief Complaint:  Abdominal Pain    HPI   Maurice Quiñones is a 59 year old male with insulin-dependent diabetes and a known ventral hernia who presents with abdominal pain. The patient notes he had a mesh repair over a decade ago with Dr. Frank at [facility], and states his ventral hernia will strangulate every once in a while, though he is able to reduce it on his own. However, around 1730 this evening, the patient reports his hernia began bulging out again, causing him to have significant abdominal pain. He states he attempted to reduce it multiple times without success, and notes he has been vomiting since then with progressively worsening abdominal pain, so he came to the ED for further evaluation. Here in the ED, he denies any pain radiating into his penis or testicles, back pain, flank pain, fevers, or other acute symptoms. Of note, the patient's last PO intake was around 1200 today.    Allergies:  No known drug allergies    Medications:    Gabapentin  Humulin  Lisinopril  Metformin  Pioglitazone  Simvastatin  Lantus  Cialis  Sildenafil    Past Medical History:    Diabetes  Mild nonproliferative diabetic retinopathy  Hyperlipidemia  Morbid obesity  Erectile dysfunction    Past Surgical History:    Right ACL reconstruction  Carpal tunnel release, right  Inguinal hernia repair  Bilateral Lasik  Left ACL/PCL repair    Family History:    Heart disease    Social History:  Smoking status: Former smoker, quit 8/15/1997  Smokeless tobacco: Current user  Alcohol use: Yes, 3-4 drinks per weekend  PCP: OSMAR GORDON  Presents to the ED with his spouse  Marital Status:  [2]     Review of Systems   Constitutional: Negative for chills and fever.   Gastrointestinal: Positive for abdominal pain, nausea and vomiting.   Genitourinary: Negative for penile pain and testicular pain.   All other systems reviewed and are negative.    Physical Exam     Patient Vitals for the past 24 hrs:   BP Temp  "Temp src Pulse Heart Rate Resp SpO2 Height Weight   01/19/19 2300 (!) 178/91 -- -- 61 -- -- 98 % -- --   01/19/19 2200 174/87 -- -- -- -- -- 98 % -- --   01/19/19 2144 188/90 97.4  F (36.3  C) Oral -- 85 18 98 % 1.778 m (5' 10\") 131.5 kg (290 lb)     Physical Exam  General: Alert, appears well-developed and well-nourished. Appears uncomfortable.  Obese.     In severe distress  HEENT:  Head:  Atraumatic  Ears:  External ears are normal  Mouth/Throat:  Oropharynx is without erythema or exudate and mucous membranes are dry.   Eyes:   Conjunctivae normal and EOM are normal. No scleral icterus.  CV:  Normal rate, regular rhythm, normal heart sounds and radial pulses are 2+ and symmetric.  No murmur.  Resp:  Breath sounds are clear bilaterally    Non-labored, no retractions or accessory muscle use  GI:  Abdomen is firm in midline.  Distention and mass to midline abdomen.  Non-reducible mass to midline abdomen. No CVA tenderness bilaterally  MS:  Normal range of motion. No edema.    Normal strength in all 4 extremities.     Back atraumatic.    No midline cervical, thoracic, or lumbar tenderness  Skin:  Warm and dry. Mild erythema overlying ventral mass.  Neuro: Alert. Normal strength.  GCS: 15  Psych:  Normal mood and affect.    Emergency Department Course   Imaging:  Radiographic findings were communicated with the patient who voiced understanding of the findings.    CT Abdomen Pelvis w contrast:  Moderate-sized ventral hernia with a 5 cm neck and a loop  of stool-filled colon with some associated stranding is present. Some  degree of ischemia and/or strangulation could be present.  As read by Radiology.    Laboratory:  CBC: WBC 17.4 (H), o/w WNL (HGB 14.9, )  CMP: Glucose 231 (H), BUN 34 (H), Creatinine 1.40 (H), GFR 55 (L), Calcium 10.3 (H), o/w WNL  Lipase: 50 (L)  Lactic acid (2154): 3.4 (H)  INR: 1.00  ABO/Rh type: A negative, antibody negative    Interventions:  2157: NS 1L IV Bolus  2201: 0.5 mg Dilaudid " IV  2236: 0.5 mg Dilaudid  2237: 4 mg Zofran IV  2241: 4.5 g Zosyn IV    Emergency Department Course:  Past medical records, nursing notes, and vitals reviewed.  2159: I performed an exam of the patient and obtained history, as documented above.  IV inserted and blood drawn.  The patient was sent for a CT abdomen pelvis while in the emergency department, findings above.    2228: I rechecked the patient. Explained findings to the patient and his spouse.    2232: I spoke to Dr. Fenton on-call for general surgery. He will take the patient to the operating room and subsequently admit him.    Findings and plan explained to the patient who consents to admission. Discussed the patient with Dr. Fenton, who will admit the patient to a medical/surgical bed after surgical intervention for further monitoring, evaluation, and treatment.      Impression & Plan    CMS Diagnoses:   The patient has signs of Severe Sepsis as evidenced by:    1. 2 SIRS criteria, AND  2. Suspected infection, AND   3. Organ dysfunction: Lactic Acid > 1.9    Time severe sepsis diagnosis confirmed = 2154 as this was the time when Lactate resulted, and the level was > 1.9    Severe Sepsis Bundle Completion:  1. Initial Lactic Acid Result:   Recent Labs   Lab Test 01/19/19 2154   LACT 3.4*     2. Blood Cultures before Antibiotics: No - patient was taken to the operating room  3. Broad Spectrum Antibiotics Administered: Yes - Zosyn     4. 1000 ml of IV fluids.  Ideal body weight: 73 kg (160 lb 15 oz)  Adjusted ideal body weight: 96.4 kg (212 lb 9 oz)    Medical Decision Making:  Maurice Quiñones is a 59 year old male with a history of diabetes who presents to the ED for evaluation of abdominal pain. The patient has a large nonreducible mass to the midline abdomen. CT scan confirms suspicion for strangulated hernia with a large amount of colon within the moderate ventral hernia. Lactate elevated at 3.4 and initial white count at 17.4. The patient began  having abdominal pain at approximately 1730 tonight. He presented with vomiting and inability to reduce this suspected incarcerated hernia. I spoke with Dr. Fenton with general surgery who agreed the patient needed emergent operative intervention. The patient was given IV Zosyn prior to transfer to the operating room. The patient has been NPO since noon today. Type and screen and INR were sent prior to transfer to the operating room. Repeat lactate was unable to be obtained prior to transfer to operating room. The patient remained otherwise vitally stable under my care in the emergency department and transferred to the OR for definitive intervention for suspected strangulated ventral hernia.    Critical Care time: none    Diagnosis:    ICD-10-CM   1. Strangulated ventral hernia K43.6   2. Ventral hernia with obstruction K43.6     Disposition: The patient was taken to the operating room    Adriana Kelley  1/19/2019    EMERGENCY DEPARTMENT    IAdriana, am serving as a scribe at 9:59 PM on 1/19/2019 to document services personally performed by Placido Cruz MD based on my observations and the provider's statements to me.      Placido Cruz MD  01/20/19 0038

## 2019-01-20 NOTE — CONSULTS
[facility]    Hospitalist Consultation    Date of Admission:  1/19/2019    Assessment & Plan      This is a 59-year-old male with history of diabetes mellitus since 2000, poorly controlled, obesity, hypothyroidism, hypertension, neuropathy, history of pancreatitis on Victoza, vitamin D deficiency, history of ventral hernia for long time, was admitted with complaint of abdominal pain, unable to push his hernia bag, nausea and vomiting, found to be incarcerated ventral hernia.  He was taken to the OR by General Surgery emergently and had ventral hernia repair laparoscopically, lysis of adhesions and primary closure.  Postoperatively, Hospice consult was given for diabetes management.     ASSESSMENT AND PLAN:   1.  Incarcerated ventral hernia, status post laparoscopic ventral hernia repair, lysis of adhesion and open primary closure:  Further management as per the General Surgery team.  He tolerated the surgery well.  Pain control with PCA at this time.   2.  Diabetes mellitus type 2:  Uncontrolled.  The patient has difficult to control diabetes.  He takes U-500 70 units b.i.d., metformin 1000 in the morning, 500 in the evening, and Actos.  At this time, we will hold the metformin and Actos as he is n.p.o., hold U-500 70 b.i.d. as well given his n.p.o. status.  I will put him on Lantus 24 units now daily, keep him on high-dose sliding scale insulin for correction per hypoglycemia protocol.  We will adjust his insulin as needed.  Once he starts eating and improves oral intake, we will advance his insulin.  Maybe use U-500 again.  Continue on hypoglycemia protocol as well. I did increase the Lantus to 24 units bid as his BS remain high.   3.  Hypertension:  Slightly on the higher side.  I will restart his lisinopril 5 mg daily at this time.  Blood pressure is slightly on higher side because of the pain as well.   4.  Acute renal failure:  The patient admitted with a creatinine of 1.4, baseline less than  1, most likely because of nausea, vomiting.  Is now resolved at this time.   5.  Hypothyroidism:  On levothyroxine.  We will continue that.   6.  History of diabetic neuropathy:  On gabapentin.  I agree with continuing with the medication.   7.  Hyperlipidemia:  On Zocor.  We will continue that once he starts taking orally.     8.  Thirst with lactic acidosis resolved now.  This is most likely because of the incarcerated hernia.     9.  Deep venous prophylaxis:  With Lovenox.  I agree with that.      Thank you for the consultation and giving us a chance to participate in the management of the patient.  We will follow the patient while he is in the hospital.         YOUSIF MCINTOSH MD              DVT Prophylaxis: Enoxaparin (Lovenox) SQ  Code Status: No Order    Disposition: Expected discharge per general surgery    Yousif Mcintosh MD    Reason for Consult   Reason for consult: I was asked by Colten ALEXANDER to evaluate this patient for DM management.    Primary Care Physician   OSMAR GORDON    Chief Complaint   Abdominal pain, nausea, vomiting.     History is obtained from the patient    History of Present Illness   Consult Date:  01/20/2019      REQUESTING PHYSICIAN:  Colten Wiggins PA-C      REASON FOR CONSULTATION:  Diabetes management.      DATE OF SERVICE:  01/20/2019      CONSULTING PHYSICIAN:  Yousif Mcintosh MD      HISTORY OF PRESENT ILLNESS:  This is a 59-year-old male with history of diabetes mellitus since 2000, poorly controlled, obesity, hypothyroidism, hypertension, neuropathy, history of pancreatitis on Victoza, vitamin D deficiency, history of ventral hernia for long time, was admitted with complaint of abdominal pain, unable to push his hernia bag, nausea and vomiting, found to be incarcerated ventral hernia.  He was taken to the OR by General Surgery emergently and had ventral hernia repair laparoscopically, lysis of adhesions and primary closure.  Postoperatively, Hospice consult was given for diabetes  management.      The patient told me that he has been diabetic since 2000.  He tried different medication, but his blood sugar has remained poorly control.  He retired recently and had increased activity.  He tried to lose some weight and his A1c goes to 8.5 and then goes back up to 9.1 again.  He has been currently taking U-500 70 units twice a day, metformin and Actos.  Blood sugar usually runs high, in 250s to 300s.      The patient denies any chest pain, shortness of breath, orthopnea, PND, or palpitation.  No nausea, vomiting at this time.  Some abdominal pain, which is controlled with the PCA.  Not passed gas or had bowel movement yet.  No dysuria, hematuria at this time.  Denies any hypoglycemic symptoms and got admitted.     Past Medical History    I have reviewed this patient's medical history and updated it with pertinent information if needed.   Past Medical History:   Diagnosis Date     Diabetes (H) 1996       Past Surgical History   I have reviewed this patient's surgical history and updated it with pertinent information if needed.  Past Surgical History:   Procedure Laterality Date     ARTHROSCOPIC RECONSTRUCTION ANTERIOR AND POSTERIOR CRUCIATE LIGAMENT, COMBINED Bilateral     2013 Left  Knee and 2007 Right Knee     LASIK BILATERAL Bilateral 2011    Clinic was located in [place]       Prior to Admission Medications   Prior to Admission Medications   Prescriptions Last Dose Informant Patient Reported? Taking?   Cholecalciferol 4000 units CAPS 1/19/2019 at am Self Yes Yes   Sig: Take 4,000 Units by mouth daily   Ibuprofen-Diphenhydramine Cit (ADVIL PM) 200-38 MG TABS 1/18/2019 at hs Self Yes Yes   Sig: Take 3 tablets by mouth At Bedtime   PSYLLIUM HUSK PO 1/19/2019 at am Self Yes Yes   Sig: Take 4 tablets by mouth daily    Vitamin Mixture (ANIYAH-C PO) 1/19/2019 at Unknown time Self Yes Yes   Sig: Take 1 tablet by mouth daily   fenofibrate 160 MG tablet 1/19/2019 at am Self Yes Yes   Sig: Take 1  tablet by mouth daily   gabapentin (NEURONTIN) 300 MG capsule 1/19/2019 at am Self Yes Yes   Sig: Take 300 mg by mouth every morning    gabapentin (NEURONTIN) 300 MG capsule 1/18/2019 at pm Self Yes Yes   Sig: Take 700 mg by mouth every evening Patient takes 2 x 300mg + 100mg capsule for a total of 700mg at bedtime.   insulin reg HIGH CONC (HUMULIN R U-500 KWIKPEN) 500 UNIT/ML PEN soln 1/19/2019 at am Self Yes Yes   Sig: Inject 70 Units Subcutaneous 2 times daily (before meals)   levothyroxine (SYNTHROID/LEVOTHROID) 75 MCG tablet 1/19/2019 at am Self Yes Yes   Sig: Take 75 mcg by mouth daily   lisinopril (PRINIVIL,ZESTRIL) 5 MG tablet 1/19/2019 at am Self Yes Yes   Sig: Take 5 mg by mouth daily   metFORMIN (GLUCOPHAGE) 1000 MG tablet 1/19/2019 at am Self Yes Yes   Sig: Take 1,000 mg by mouth daily (with breakfast)   metFORMIN (GLUCOPHAGE) 500 MG tablet 1/18/2019 at pm Self Yes Yes   Sig: Take 500 mg by mouth daily (with dinner)   pioglitazone (ACTOS) 30 MG tablet 1/19/2019 at am Self Yes Yes   Sig: Take 30 mg by mouth daily   simvastatin (ZOCOR) 80 MG tablet 1/18/2019 at hs Self Yes Yes   Sig: Take 80 mg by mouth At Bedtime       Facility-Administered Medications: None     Allergies   No Known Allergies    Social History   I have reviewed this patient's social history and updated it with pertinent information if needed. Maurice Valerio Ishmael  reports that he has quit smoking. His smokeless tobacco use includes chew.    Family History   I have reviewed this patient's family history and updated it with pertinent information if needed.   Family History   Problem Relation Age of Onset     Heart Surgery Mother      Glaucoma No family hx of      Macular Degeneration No family hx of        Review of Systems   CONSTITUTIONAL:  positive for  fatigue  EYES:  negative  HEENT:  negative  RESPIRATORY:  negative  CARDIOVASCULAR:  negative  GASTROINTESTINAL:  positive for abdominal pain  GENITOURINARY:   negative  INTEGUMENT/BREAST:  negative  HEMATOLOGIC/LYMPHATIC:  negative  ALLERGIC/IMMUNOLOGIC:  negative  ENDOCRINE:  negative  MUSCULOSKELETAL:  negative  NEUROLOGICAL:  negative  BEHAVIOR/PSYCH:  negative    Physical Exam   Temp: 97.7  F (36.5  C) Temp src: Oral BP: (!) 143/94 Pulse: 101 Heart Rate: 103 Resp: 20 SpO2: 93 % O2 Device: Nasal cannula Oxygen Delivery: 2 LPM  Vital Signs with Ranges  Temp:  [96.6  F (35.9  C)-98.4  F (36.9  C)] 97.7  F (36.5  C)  Pulse:  [] 101  Heart Rate:  [] 103  Resp:  [18-26] 20  BP: (133-188)/() 143/94  SpO2:  [93 %-100 %] 93 %  290 lbs 0 oz    Constitutional:  Lethargic but cooperative, no apparent distress.  Eyes: Conjunctiva and pupils examined and normal.  HEENT: Moist mucous membranes, normal dentition.  Respiratory: Clear to auscultation bilaterally, no crackles or wheezing.  Cardiovascular: Regular rate and rhythm, normal S1 and S2, and no murmur noted.  GI: Soft,mildly distended, midline dressing in place, drain on LLQ, non-tender, sluggish bowel sounds.  Lymph/Hematologic: No anterior cervical or supraclavicular adenopathy.  Skin: No rashes, no cyanosis, no edema.  Musculoskeletal: No joint swelling, erythema or tenderness.  Neurologic: Cranial nerves 2-12 intact, normal strength and sensation.  Psychiatric:  no obvious anxiety or depression.    Data   -Data reviewed today: All pertinent laboratory and imaging results from this encounter were reviewed. I personally reviewed no images or EKG's today.  Recent Labs   Lab 01/20/19  0635 01/19/19  2154   WBC  --  17.4*   HGB  --  14.9   MCV  --  89    303   INR  --  1.00   NA  --  138   POTASSIUM  --  4.2   CHLORIDE  --  103   CO2  --  23   BUN  --  34*   CR 1.00 1.40*   ANIONGAP  --  12   DELBERT  --  10.3*   GLC  --  231*   ALBUMIN  --  4.4   PROTTOTAL  --  8.5   BILITOTAL  --  0.5   ALKPHOS  --  53   ALT  --  31   AST  --  14   LIPASE  --  50*       Recent Results (from the past 24 hour(s))   CT  Abdomen Pelvis w Contrast    Narrative    CT ABDOMEN AND PELVIS WITH CONTRAST 1/19/2019 10:28 PM     HISTORY: Abdominal distension. Abdominal infection (including  peritonitis). See the Clinical Information for Interpreting Provider.  Ventral abdominal wall hernia. Concern for hernia strangulation.    COMPARISON: None.    TECHNIQUE: Volumetric helical acquisition of CT images from the lung  bases through the symphysis pubis after the administration of 135 mL  Isovue-370 intravenous contrast. Radiation dose for this scan was  reduced using automated exposure control, adjustment of the mA and/or  kV according to patient size, or iterative reconstruction technique.    FINDINGS: There is a relatively narrow neck ventral hernia containing  stool filled transverse colon. Some stranding is present within the  bowel loops in the hernia. No perforation demonstrated. The colonic  loop may be obstructed in terms of the amount of stool present.  Appendix not seen. No hydronephrosis. Normal caliber aorta. No  diverticulitis. Some fatty infiltration of the liver noted. The liver,  spleen, adrenal glands, kidneys, and pancreas demonstrate no worrisome  focal lesion. Trace peripheral fibrosis and/or atelectasis in the lung  bases.    Survey of the visualized bony structures demonstrates no destructive  bony lesions.      Impression    IMPRESSION: Moderate-sized ventral hernia with a 5 cm neck and a loop  of stool-filled colon with some associated stranding is present. Some  degree of ischemia and/or strangulation could be present.    RADHA NOVA MD

## 2019-01-20 NOTE — PLAN OF CARE
A&O. VSS. Lung sounds clear, diminished. IS teaching provided, pt felt pain, needs reinforcement. Bowel sounds faint and hypoactive. Gas-, BM-. Abdominal incision with gauze CDI. 3 lap sites with band-aids. Ball in place with adequate output. PCA for pain. KAILEY to bulb suction  with serosanguinous output. Pt dangled and now sitting up in chair.  Tolerating clear liquid diet.

## 2019-01-20 NOTE — OP NOTE
PREOPERATIVE DIAGNOSIS: Incarcerated ventral hernia  POSTOPERATIVE DIAGNOSIS: same  PROCEDURE: Combined open primary closure and Laparoscopic repair of ventral hernia.  Lysis of intra-abdominal adhesions  Surgeon: Michael Fenton MD  1st Assistant: Colten Wiggins PA-C, Physician assistant first assistant was necessary during the performance of this procedure for expertise in patient positioning, prepping, draping, trocar placement, camera management, retraction and exposure, and suctioning.  ANESTHESIA: General endotracheal.   ESTIMATED BLOOD LOSS: 100cc   DRAINS: 15 round Mervin-Frank drain in the subcutaneous space  COMPLICATIONS: None.   SPECIMENS: None.   INDICATIONS: 59-year-old gentleman presented to the emergency department with incarcerated ventral hernia and obstruction. I discussed with the patient  therapeutic options and it was elected to proceed with combined open and laparoscopic repair. The potential risks of bleeding, infection, bowel injury, recurrent hernia, chronic prosthetic infection or chronic pain were all reviewed in detail and he wished to proceed.   DESCRIPTION OF PROCEDURE: After informed consent was obtained, the patient was taken to the operating room and placed supine on the operating table. Following the induction of adequate general endotracheal anesthesia, a Ball catheter was placed using aseptic technique and the abdomen was shaved, prepped and draped in the usual fashion. A surgical timeout was initiated and completed, appropriate IV antibiotics were administered.  A stab incision was made in the left upper quadrant through which a 5 mm optical trocar was used to gain access to the abdominal cavity.  Carbon dioxide pneumoperitoneum was subsequently established.  A 5 mm 30 degree laparoscope was then utilized to visualize the intra-abdominal wall.  There were significant adhesions in and around the area of the hernia and I could not find what I felt to be an adequate window that  would allow for some degree of fascial release to reduce the hernia.  I therefore elected to proceed with initially an open procedure, a vertical midline incision was made in the upper abdomen.  Dissection was carried down until the hernia sac was encountered. The sac was opened.  There was some benign-appearing fluid within the sac.  The incarcerated portion of colon was viable.  There were significant adhesions inside the sac as well as around the fascial margins.  I also noted while dissecting into the subcutaneous space that there were several other hernias in this location with a total of 4 separate smaller hernias all mostly containing simply omentum.  I proceeded to take down all of the adhesions to the inside of the sac as well as the adhesions around the margins of the fascial defect.  While doing so I ultimately released and decompressed my pneumoperitoneum.  The various hernia sacs were all excised.  The contents of the hernia had been easily reduced at this time.  Hemostasis was adequate and assured associated with the contents of the abdomen that had been trapped in the hernia.  I then proceeded with primary fascial closure using looped 0 Maxon suture.  Once the fascia was completely closed I again initiated a carbon dioxide pneumoperitoneum.  Using a 5 mm 30 degree laparoscope an 11 mm port was placed in the right upper quadrant as well as a 5 movement report in the right lower abdomen.  An additional 5 movement report was placed in the left lower abdomen.  Anterior abdominal wall was surveyed.  Fascial closure looked good.  I could again visualize the bowel that had been contained within the hernia and this again was viable without any real significant abnormalities visible. I elected to reinforce this primary closure with a [id number] Symbotex mesh that was trimmed down slightly to an 18 x 25 cm mesh.  A positioning suture was placed in the center of the mesh. The mesh was then introduced into the  abdominal cavity. The positioning suture was brought out through the center of the primary closure and used to suspend the mesh to the anterior abdominal wall. With the mesh in good position, the AbsorbaTack absorbable fixation device was used to circumferentially tack the mesh into position. We had wide good overlapping coverage of the entire margins of the hernia closure. Once the mesh was completely tacked into position, all trocars were removed. Carbon dioxide was massaged from the abdomen.  A 15 round Mervin-Frank drain was brought in through my left lower quadrant 5 movement report site and placed within the subcutaneous space.  This was sutured in position at the skin exit site.  Local anesthetic was injected. The midline incision was closed using a combination of 3.0 vicryl on the subq and staples on the skin.  The laparoscopic incisions were closed with subcuticular 4-0 Vicryl stitches. Benzoin and Steri-Strips were applied. Needle and sponge counts were correct. The patient tolerated this well. He was awakened in the operating room, extubated and taken to recovery room in stable condition.   UZIEL STACK MD

## 2019-01-20 NOTE — CONSULTS
Consult Date:  01/20/2019      REQUESTING PHYSICIAN:  Colten Wiggins PA-C      REASON FOR CONSULTATION:  Diabetes management.      DATE OF SERVICE:  01/20/2019      CONSULTING PHYSICIAN:  Yousif Mcintosh MD      HISTORY OF PRESENT ILLNESS:  This is a 59-year-old male with history of diabetes mellitus since 2000, poorly controlled, obesity, hypothyroidism, hypertension, neuropathy, history of pancreatitis on Victoza, vitamin D deficiency, history of ventral hernia for long time, was admitted with complaint of abdominal pain, unable to push his hernia bag, nausea and vomiting, found to be incarcerated ventral hernia.  He was taken to the OR by General Surgery emergently and had ventral hernia repair laparoscopically, lysis of adhesions and primary closure.  Postoperatively, Hospice consult was given for diabetes management.      The patient told me that he has been diabetic since 2000.  He tried different medication, but his blood sugar has remained poorly control.  He retired recently and had increased activity.  He tried to lose some weight and his A1c goes to 8.5 and then goes back up to 9.1 again.  He has been currently taking U-500 70 units twice a day, metformin and Actos.  Blood sugar usually runs high, in 250s to 300s.      The patient denies any chest pain, shortness of breath, orthopnea, PND, or palpitation.  No nausea, vomiting at this time.  Some abdominal pain, which is controlled with the PCA.  Not passed gas or had bowel movement yet.  No dysuria, hematuria at this time.  Denies any hypoglycemic symptoms and got admitted.      ASSESSMENT AND PLAN:   1.  Incarcerated ventral hernia, status post laparoscopic ventral hernia repair, lysis of adhesion and open primary closure:  Further management as per the General Surgery team.  He tolerated the surgery well.  Pain control with PCA at this time.   2.  Diabetes mellitus type 2:  Uncontrolled.  The patient has difficult to control diabetes.  He takes U-500 70 units  b.i.d., metformin 1000 in the morning, 500 in the evening, and Actos.  At this time, we will hold the metformin and Actos as he is n.p.o., hold U-500 70 b.i.d. as well given his n.p.o. status.  I will put him on Lantus 24 units now daily, keep him on high-dose sliding scale insulin for correction per hypoglycemia protocol.  We will adjust his insulin as needed.  Once he starts eating and improves oral intake, we will advance his insulin.  Maybe use U-500 again.  Continue on hypoglycemia protocol as well. I did increase the Lantus to 24 units bid as his BS remain high.   3.  Hypertension:  Slightly on the higher side.  I will restart his lisinopril 5 mg daily at this time.  Blood pressure is slightly on higher side because of the pain as well.   4.  Acute renal failure:  The patient admitted with a creatinine of 1.4, baseline less than 1, most likely because of nausea, vomiting.  Is now resolved at this time.   5.  Hypothyroidism:  On levothyroxine.  We will continue that.   6.  History of diabetic neuropathy:  On gabapentin.  I agree with continuing with the medication.   7.  Hyperlipidemia:  On Zocor.  We will continue that once he starts taking orally.     8.  Thirst with lactic acidosis resolved now.  This is most likely because of the incarcerated hernia.     9.  Deep venous prophylaxis:  With Lovenox.  I agree with that.      Thank you for the consultation and giving us a chance to participate in the management of the patient.  We will follow the patient while he is in the hospital.         KAYLYN WEI MD             D: 2019   T: 2019   MT: FREDERICK      Name:     KRISTIN OTTO   MRN:      [id number]        Account:       [id number]   :      1960           Consult Date:  2019      Document: [id number]       cc: DEJA MINAYA PA-C

## 2019-01-20 NOTE — ANESTHESIA CARE TRANSFER NOTE
Patient: Maurice Quiñones    Procedure(s):  OPEN REPAIR OF INCARCERATED VENTRAL HERNIA  LAPAROSCOPIC REPAIR OF INCARCERTED VENTRAL HERNIA WITH MESH    Diagnosis: INCARCERATED HERNIA  Diagnosis Additional Information: No value filed.    Anesthesia Type:   General, RSI, ETT     Note:  Airway :Face Mask  Patient transferred to:PACU  Comments: Neuromuscular blockade reversed after TOF 4/4, spontaneous respirations, adequate tidal volumes, followed commands to voice, oropharynx suctioned with soft flexible catheter, extubated atraumatically, extubated with suction, airway patent after extubation.  Oxygen via facemask at 8 liters per minute to PACU. Oxygen tubing connected to wall O2 in PACU, SpO2, NiBP, and EKG monitors and alarms on and functioning, Con Hugger warmer connected to patient gown, report on patient's clinical status given to PACU RN, RN questions answered. Handoff Report: Identifed the Patient, Identified the Reponsible Provider, Reviewed the pertinent medical history, Discussed the surgical course, Reviewed Intra-OP anesthesia mangement and issues during anesthesia, Set expectations for post-procedure period and Allowed opportunity for questions and acknowledgement of understanding      Vitals: (Last set prior to Anesthesia Care Transfer)    CRNA VITALS  1/20/2019 0132 - 1/20/2019 0214      1/20/2019             Pulse:  85    SpO2:  98 %    Resp Rate (observed):  16    Resp Rate (set):  10                Electronically Signed By: JAMAR Delaney CRNA  January 20, 2019  2:14 AM

## 2019-01-20 NOTE — CONSULTS
General Surgery Consultation    Maurice Quiñones MRN# [id number]   Age: 59 year old YOB: 1960     Date of Admission:  1/19/2019    Reason for consult:  Incarcerated ventral hernia with colonic obstruction       Requesting physician:  Placido Cruz MD                Assessment and Plan:   Assessment:   Morbidly obese 59-year-old gentleman with incarcerated ventral hernia containing colon, elevated white blood cell count as well as lactic acidosis concerning for possible strangulated hernia      Plan:   Therapeutic options were discussed with the patient.  Going to proceed with laparoscopic possible open repair of incarcerated ventral hernia.  Risks, benefits and outcomes were discussed.  Patient's questions were answered and he consents to proceed.             Chief Complaint:   Abdominal pain     History is obtained from the patient, electronic health record and emergency department physician         History of Present Illness:   This patient is a 59 year old  male  who presents with an incarcerated ventral hernia.  E by report previously underwent repair of umbilical hernia as well as inguinal hernia many years ago at an outside institution.  He has noted a recurrence for many years in the area above and around his umbilicus.  He reports that this is been reducible.  At approximately 5:30 PM he noted that the hernia became more hard and painful.  He attempted to reduce it on his own and was unsuccessful.  Pain got worse and he began to vomit.  He ultimately presented to the emergency department for further evaluation.  He was evaluated and found to have a nonreducible hernia with elevated white blood cell count as well as lactic acidosis.  Surgical consultation was then requested to deliver definitive care.          Past Medical History:    has a past medical history of Diabetes (H) (1996).  Morbid obesity, hypertension, obstructive sleep apnea, restless leg syndrome          Past  Surgical History:     Past Surgical History:   Procedure Laterality Date     ARTHROSCOPIC RECONSTRUCTION ANTERIOR AND POSTERIOR CRUCIATE LIGAMENT, COMBINED Bilateral     2013 Left  Knee and 2007 Right Knee     LASIK BILATERAL Bilateral 2011    Clinic was located in [place]           Medications:     No current facility-administered medications on file prior to encounter.   Current Outpatient Medications on File Prior to Encounter:  Cholecalciferol 4000 units CAPS Take 4,000 Units by mouth daily   fenofibrate 160 MG tablet Take 1 tablet by mouth daily   gabapentin (NEURONTIN) 300 MG capsule Take 700 mg by mouth every evening Patient takes 2 x 300mg + 100mg capsule for a total of 700mg at bedtime.   gabapentin (NEURONTIN) 300 MG capsule Take 300 mg by mouth every morning    Ibuprofen-Diphenhydramine Cit (ADVIL PM) 200-38 MG TABS Take 3 tablets by mouth At Bedtime   insulin reg HIGH CONC (HUMULIN R U-500 KWIKPEN) 500 UNIT/ML PEN soln Inject 70 Units Subcutaneous 2 times daily (before meals)   levothyroxine (SYNTHROID/LEVOTHROID) 75 MCG tablet Take 75 mcg by mouth daily   lisinopril (PRINIVIL,ZESTRIL) 5 MG tablet Take 5 mg by mouth daily   metFORMIN (GLUCOPHAGE) 1000 MG tablet Take 1,000 mg by mouth daily (with breakfast)   metFORMIN (GLUCOPHAGE) 500 MG tablet Take 500 mg by mouth daily (with dinner)   pioglitazone (ACTOS) 30 MG tablet Take 30 mg by mouth daily   PSYLLIUM HUSK PO Take 4 tablets by mouth daily    simvastatin (ZOCOR) 80 MG tablet Take 80 mg by mouth At Bedtime    Vitamin Mixture (ANIYAH-C PO) Take 1 tablet by mouth daily         Allergies:    No Known Allergies         Social History:   Patient is , retired, former smoker, drinks 3-4 alcoholic beverages in the weekend          Family History:   The patient has no family history of any bleeding, clotting or anesthesia problems.          Review of Systems:   Ten point Review of Systems is negative other than noted in the HPI          Physical  "Exam:   Gen:  This is a well developed, well nourished man in no apparent distress.  Blood pressure 156/70, pulse 61, temperature 97.4  F (36.3  C), temperature source Oral, resp. rate 18, height 1.778 m (5' 10\"), weight 131.5 kg (290 lb), SpO2 98 %.  HEENT - Normocephalic, atraumatic, mucous membranes moist.  no scleral icterus.  Neck - supple without masses  Lungs - clear to ascultation.    Heart - Regular rate & rhythm without murmur  Abdomen:   Morbidly obese, distended, tenderness noted in the central midline in the area of the palpable nonreducible hernia.  And nonreducible ventral hernia, hyperactive bowel sounds, high-pitched bowel sounds   Extremities - warm without edema  Neurologic - nonfocal          Data:   WBC -   WBC   Date Value Ref Range Status   01/19/2019 17.4 (H) 4.0 - 11.0 10e9/L Final   ], HgB - Hemoglobin   Date Value Ref Range Status   01/19/2019 14.9 13.3 - 17.7 g/dL Final   ]   Liver Function Studies -   Recent Labs   Lab Test 01/19/19  2154   PROTTOTAL 8.5   ALBUMIN 4.4   BILITOTAL 0.5   ALKPHOS 53   AST 14   ALT 31     CT scan of the abdomen:   Personally reviewed   Ultrasound of the abdomen:     Michael Fenton MD       "

## 2019-01-20 NOTE — PHARMACY-ADMISSION MEDICATION HISTORY
Admission medication history interview status for the 1/19/2019  admission is complete. See EPIC admission navigator for prior to admission medications     Medication history source reliability:Good    Actions taken by pharmacist (provider contacted, etc):Veriifed all medications with patient and also verified medications with patient's CareEverywhere. Patient states that no changes have been made to oral medications since last renewed at May 2018.      Additional medication history information not noted on PTA med list :Patient does use U-500 pen.     Medication reconciliation/reorder completed by provider prior to medication history? No    Time spent in this activity: 20 minutes    Prior to Admission medications    Medication Sig Last Dose Taking? Auth Provider   Cholecalciferol 4000 units CAPS Take 4,000 Units by mouth daily 1/19/2019 at am Yes Unknown, Entered By History   fenofibrate 160 MG tablet Take 1 tablet by mouth daily 1/19/2019 at am Yes Reported, Patient   gabapentin (NEURONTIN) 300 MG capsule Take 700 mg by mouth every evening Patient takes 2 x 300mg + 100mg capsule for a total of 700mg at bedtime. 1/18/2019 at pm Yes Unknown, Entered By History   gabapentin (NEURONTIN) 300 MG capsule Take 300 mg by mouth every morning  1/19/2019 at am Yes Reported, Patient   Ibuprofen-Diphenhydramine Cit (ADVIL PM) 200-38 MG TABS Take 3 tablets by mouth At Bedtime 1/18/2019 at hs Yes Unknown, Entered By History   insulin reg HIGH CONC (HUMULIN R U-500 KWIKPEN) 500 UNIT/ML PEN soln Inject 70 Units Subcutaneous 2 times daily (before meals) 1/19/2019 at am Yes Unknown, Entered By History   levothyroxine (SYNTHROID/LEVOTHROID) 75 MCG tablet Take 75 mcg by mouth daily 1/19/2019 at am Yes Unknown, Entered By History   lisinopril (PRINIVIL,ZESTRIL) 5 MG tablet Take 5 mg by mouth daily 1/19/2019 at am Yes Reported, Patient   metFORMIN (GLUCOPHAGE) 1000 MG tablet Take 1,000 mg by mouth daily (with breakfast) 1/19/2019 at am  Yes Reported, Patient   metFORMIN (GLUCOPHAGE) 500 MG tablet Take 500 mg by mouth daily (with dinner) 1/18/2019 at pm Yes Unknown, Entered By History   pioglitazone (ACTOS) 30 MG tablet Take 30 mg by mouth daily 1/19/2019 at am Yes Unknown, Entered By History   PSYLLIUM HUSK PO Take 4 tablets by mouth daily  1/19/2019 at am Yes Reported, Patient   simvastatin (ZOCOR) 80 MG tablet Take 80 mg by mouth At Bedtime  1/18/2019 at hs Yes Reported, Patient   Vitamin Mixture (ANIYAH-C PO) Take 1 tablet by mouth daily 1/19/2019 at Unknown time Yes Unknown, Entered By History

## 2019-01-21 LAB
GLUCOSE BLDC GLUCOMTR-MCNC: 161 MG/DL (ref 70–99)
GLUCOSE BLDC GLUCOMTR-MCNC: 177 MG/DL (ref 70–99)
GLUCOSE BLDC GLUCOMTR-MCNC: 183 MG/DL (ref 70–99)
GLUCOSE BLDC GLUCOMTR-MCNC: 215 MG/DL (ref 70–99)
GLUCOSE BLDC GLUCOMTR-MCNC: 259 MG/DL (ref 70–99)
GLUCOSE BLDC GLUCOMTR-MCNC: 273 MG/DL (ref 70–99)
GLUCOSE BLDC GLUCOMTR-MCNC: 302 MG/DL (ref 70–99)
GLUCOSE BLDC GLUCOMTR-MCNC: 338 MG/DL (ref 70–99)

## 2019-01-21 PROCEDURE — 12000000 ZZH R&B MED SURG/OB

## 2019-01-21 PROCEDURE — 25000128 H RX IP 250 OP 636: Performed by: PHYSICIAN ASSISTANT

## 2019-01-21 PROCEDURE — 99233 SBSQ HOSP IP/OBS HIGH 50: CPT | Performed by: INTERNAL MEDICINE

## 2019-01-21 PROCEDURE — 00000146 ZZHCL STATISTIC GLUCOSE BY METER IP

## 2019-01-21 PROCEDURE — 25000132 ZZH RX MED GY IP 250 OP 250 PS 637: Performed by: INTERNAL MEDICINE

## 2019-01-21 PROCEDURE — 25000131 ZZH RX MED GY IP 250 OP 636 PS 637: Performed by: INTERNAL MEDICINE

## 2019-01-21 PROCEDURE — 25000132 ZZH RX MED GY IP 250 OP 250 PS 637: Performed by: PHYSICIAN ASSISTANT

## 2019-01-21 RX ADMIN — RANITIDINE 150 MG: 150 TABLET ORAL at 17:53

## 2019-01-21 RX ADMIN — INSULIN GLARGINE 35 UNITS: 100 INJECTION, SOLUTION SUBCUTANEOUS at 09:05

## 2019-01-21 RX ADMIN — RANITIDINE 150 MG: 150 TABLET ORAL at 06:30

## 2019-01-21 RX ADMIN — ACETAMINOPHEN 975 MG: 325 TABLET, FILM COATED ORAL at 16:49

## 2019-01-21 RX ADMIN — GABAPENTIN 700 MG: 100 CAPSULE ORAL at 20:32

## 2019-01-21 RX ADMIN — SIMVASTATIN 80 MG: 40 TABLET, FILM COATED ORAL at 21:28

## 2019-01-21 RX ADMIN — SENNOSIDES AND DOCUSATE SODIUM 2 TABLET: 8.6; 5 TABLET ORAL at 09:02

## 2019-01-21 RX ADMIN — SODIUM CHLORIDE: 9 INJECTION, SOLUTION INTRAVENOUS at 14:45

## 2019-01-21 RX ADMIN — LISINOPRIL 5 MG: 5 TABLET ORAL at 09:03

## 2019-01-21 RX ADMIN — ACETAMINOPHEN 975 MG: 325 TABLET, FILM COATED ORAL at 09:02

## 2019-01-21 RX ADMIN — GABAPENTIN 300 MG: 300 CAPSULE ORAL at 09:03

## 2019-01-21 RX ADMIN — ENOXAPARIN SODIUM 40 MG: 40 INJECTION SUBCUTANEOUS at 09:03

## 2019-01-21 RX ADMIN — LEVOTHYROXINE SODIUM 75 MCG: 75 TABLET ORAL at 09:02

## 2019-01-21 RX ADMIN — SODIUM CHLORIDE: 9 INJECTION, SOLUTION INTRAVENOUS at 22:45

## 2019-01-21 RX ADMIN — SENNOSIDES AND DOCUSATE SODIUM 2 TABLET: 8.6; 5 TABLET ORAL at 20:32

## 2019-01-21 RX ADMIN — INSULIN GLARGINE 35 UNITS: 100 INJECTION, SOLUTION SUBCUTANEOUS at 20:36

## 2019-01-21 ASSESSMENT — ACTIVITIES OF DAILY LIVING (ADL)
ADLS_ACUITY_SCORE: 14

## 2019-01-21 NOTE — PROGRESS NOTES
"Surgery    Typical post op abdominal pain  No nausea  No bowel function yet  Walking a little.  Huerta in place    Gen:  Awake, Alert, reasonably comfortable  Blood pressure 157/79, pulse 82, temperature 98.5  F (36.9  C), temperature source Oral, resp. rate 18, height 1.778 m (5' 10\"), weight 131.5 kg (290 lb), SpO2 95 %.  Resp - clear to ascultation.    Cardiac - Regular rate & rhythm without murmur  Abdomen - soft, appropriately tender, dressing clean.  Extremities - no lower extremity edema.    KAILEY 100cc/24 hours s/sang  Glu 290-367    A/P 58 yo man with poorly controlled DM2 and chronic recurrent large incarcerated VH, s/p combined open and laparoscopic ventral hernia repair with mesh on 1/20/19.    - huerta out  - continue clears until ROBF  - bowel regimen  - ambulate: patient declines PT at this time.  - transition to PO pain meds      Colten Wiggins PA-C  Office: [phone]  Pager: [phone]    "

## 2019-01-21 NOTE — PLAN OF CARE
A&O x4. VSS on room air. Lungs shallow and diminished. BS hypoactive, BM-, flatus-. Abdominal dressing CDI. KAILEY drain in place, dressing CDI. Tolerating clear liquid diet. Denies N/V. . Voiding w/patent huerta. Dilaudid PCA for pain. Up w/1. CPAP at night.

## 2019-01-21 NOTE — PLAN OF CARE
A&O. VSS. Lung sounds clear, diminished, IS performed. Bowel sounds hypoactive and faint. Gas-, BM-. Ball removed at 1000, bladder scan for 75ml at 1450. PCA in use for pain. Tolerating clear liquids. Up with assist of 1. KAILEY to bulb suction with serosanguinous drainage and stripped. Incision with gauze CDI. Lap sites with band-aids CDI.

## 2019-01-21 NOTE — PLAN OF CARE
[01/21/19 0333 Lobito Sanchez, RN - Registered Nurse]   Vital signs stable. Alert and oriented. Lung sounds clear. Bowel sounds active, flatus negative. KAILEY and hernia repair incision dressing clean dry and intact. Pain controlled with PCA. Up assist of one. Tolerating liquids. CMS/ neuros intact.

## 2019-01-21 NOTE — PROGRESS NOTES
[facility]    Hospitalist Progress Note    Brief Summary:   This is a 59-year-old male with history of diabetes mellitus since 2000, poorly controlled, obesity, hypothyroidism, hypertension, neuropathy, history of pancreatitis on Victoza, vitamin D deficiency, history of ventral hernia for long time, was admitted with complaint of abdominal pain, unable to push his hernia bag, nausea and vomiting, found to be incarcerated ventral hernia.  He was taken to the OR by General Surgery emergently and had ventral hernia repair laparoscopically, lysis of adhesions and primary closure.  Postoperatively, Hospice consult was given for diabetes management.       Assessment & Plan     1.  Incarcerated ventral hernia, status post laparoscopic ventral hernia repair, lysis of adhesion and open primary closure:  Further management as per the General Surgery team.  He tolerated the surgery well.  Pain control with PCA at this time.   Consider stopping the PCA, ambulate the patient.    2.  Diabetes mellitus type 2:  Uncontrolled.  The patient has difficult to control diabetes.  He takes U-500 70 units b.i.d., metformin 1000 in the morning, 500 in the evening, and Actos.  At this time, we will hold the metformin and Actos while in the hospital, hold U-500 70 b.i.d. as well given his only clear liquid diet .  I started him on Lantus 24 units twice daily and high-dose sliding scale insulin for correction per hypoglycemia protocol.   His blood sugar remained on the higher side and uncontrolled at this time.  I will increase the Lantus to 35 units twice daily and put him on NovoLog 18 units with the meal plus sliding scale insulin high-dose will be continue, We will adjust his insulin as needed.  Once he starts eating and improves oral intake, we will advance his insulin.  Maybe use U-500 again.      3.  Hypertension:   Now it is much better control  with lisinopril 5 mg daily at this time.      4.  Acute renal failure:   The patient admitted with a creatinine of 1.4, baseline less than 1, most likely because of nausea, vomiting.  Is now resolved at this time.   5.  Hypothyroidism:  On levothyroxine.  We will continue that.   6.  History of diabetic neuropathy:  On gabapentin.  I agree with continuing with the medication.   7.  Hyperlipidemia:  On Zocor.  We will continue that once he starts taking orally.     8.  Thirst with lactic acidosis resolved now.  This is most likely because of the incarcerated hernia.     9.  Deep venous prophylaxis:  With Lovenox.  I agree with that.   10.  Morbid obesity, with sleep apnea diabetes and hypertension: IV in the future the patient will benefit from weight loss if unable to do significant weight loss I recommended him that he should consider bariatric surgery    Increase Lantus to 35 units twice daily  Start on NovoLog 18 units with the meal  Continue high dose sliding scale at this time every 4 hours daily on clear liquid diet.     Discussed with the nursing staff taking care of the patient.     MD Yousif GILLETTE MD  Text Page  (7am - 6pm)    Interval History   Feeling better than yesterday has some abdominal pain only use PCA 3 times only overnight  Not passing any gas or had any bowel movement yet, no fever chills chest pain shortness of breath orthopnea PND palpitation.    No other significant event overnight.    -Data reviewed today: I reviewed all new labs and imaging results over the last 24 hours. I personally reviewed no images or EKG's today.    Physical Exam   Temp: 98.2  F (36.8  C) Temp src: Oral BP: 148/79 Pulse: 82 Heart Rate: 77 Resp: 18 SpO2: 95 % O2 Device: BiPAP/CPAP Oxygen Delivery: 2 LPM  Vitals:    01/19/19 2144   Weight: 131.5 kg (290 lb)     Vital Signs with Ranges  Temp:  [98.2  F (36.8  C)-99.3  F (37.4  C)] 98.2  F (36.8  C)  Pulse:  [82-93] 82  Heart Rate:  [77-88] 77  Resp:  [18-20] 18  BP: (117-157)/(68-79) 148/79  SpO2:  [93 %-97 %] 95  %  I/O last 3 completed shifts:  In: 3752.92 [P.O.:760; I.V.:2992.92]  Out: 1400 [Urine:1300; Drains:100]    Constitutional: awake, alert, cooperative, no apparent distress, and appears stated age  Eyes: Lids and lashes normal, pupils equal, round and reactive to light, extra ocular muscles intact, sclera clear, conjunctiva normal  Respiratory: diminished breath sounds bilaterally  Cardiovascular: Normal apical impulse, regular rate and rhythm, normal S1 and S2, no S3 or S4, and no murmur noted  GI: No scars,  bowel sounds positive, soft, non-distended, mild tenderness, dressing in place, no masses palpated, no hepatosplenomegally  Skin: no bruising or bleeding  Musculoskeletal: there is no redness, warmth, or swelling of the joints  Neurologic: No focal deficit    Medications     HYDROmorphone       sodium chloride 125 mL/hr at 01/20/19 1600       acetaminophen  975 mg Oral Q8H     enoxaparin  40 mg Subcutaneous Q24H     ranitidine  150 mg Oral Q12H    Or     famotidine  20 mg Intravenous Q12H     gabapentin  300 mg Oral QAM     gabapentin  700 mg Oral QPM     insulin aspart  18 Units Subcutaneous TID w/meals     insulin aspart  1-12 Units Subcutaneous Q4H     insulin glargine  35 Units Subcutaneous BID     levothyroxine  75 mcg Oral Daily     lisinopril  5 mg Oral Daily     senna-docusate  1 tablet Oral BID    Or     senna-docusate  2 tablet Oral BID     simvastatin  80 mg Oral At Bedtime     sodium chloride (PF)  3 mL Intracatheter Q8H       Data   Recent Labs   Lab 01/20/19  0635 01/19/19  2154   WBC  --  17.4*   HGB  --  14.9   MCV  --  89    303   INR  --  1.00   NA  --  138   POTASSIUM  --  4.2   CHLORIDE  --  103   CO2  --  23   BUN  --  34*   CR 1.00 1.40*   ANIONGAP  --  12   DELBERT  --  10.3*   GLC  --  231*   ALBUMIN  --  4.4   PROTTOTAL  --  8.5   BILITOTAL  --  0.5   ALKPHOS  --  53   ALT  --  31   AST  --  14   LIPASE  --  50*       No results found for this or any previous visit (from the past 24  hour(s)).

## 2019-01-22 LAB
ALBUMIN SERPL-MCNC: 2.6 G/DL (ref 3.4–5)
ANION GAP SERPL CALCULATED.3IONS-SCNC: 7 MMOL/L (ref 3–14)
BASOPHILS # BLD AUTO: 0 10E9/L (ref 0–0.2)
BASOPHILS NFR BLD AUTO: 0.1 %
BUN SERPL-MCNC: 12 MG/DL (ref 7–30)
CALCIUM SERPL-MCNC: 8.4 MG/DL (ref 8.5–10.1)
CHLORIDE SERPL-SCNC: 108 MMOL/L (ref 94–109)
CO2 SERPL-SCNC: 26 MMOL/L (ref 20–32)
CREAT SERPL-MCNC: 0.96 MG/DL (ref 0.66–1.25)
DIFFERENTIAL METHOD BLD: ABNORMAL
EOSINOPHIL # BLD AUTO: 0.2 10E9/L (ref 0–0.7)
EOSINOPHIL NFR BLD AUTO: 1.8 %
ERYTHROCYTE [DISTWIDTH] IN BLOOD BY AUTOMATED COUNT: 14 % (ref 10–15)
GFR SERPL CREATININE-BSD FRML MDRD: 86 ML/MIN/{1.73_M2}
GLUCOSE BLDC GLUCOMTR-MCNC: 111 MG/DL (ref 70–99)
GLUCOSE BLDC GLUCOMTR-MCNC: 127 MG/DL (ref 70–99)
GLUCOSE BLDC GLUCOMTR-MCNC: 146 MG/DL (ref 70–99)
GLUCOSE BLDC GLUCOMTR-MCNC: 210 MG/DL (ref 70–99)
GLUCOSE BLDC GLUCOMTR-MCNC: 90 MG/DL (ref 70–99)
GLUCOSE SERPL-MCNC: 154 MG/DL (ref 70–99)
HCT VFR BLD AUTO: 32.9 % (ref 40–53)
HGB BLD-MCNC: 10.8 G/DL (ref 13.3–17.7)
HGB BLD-MCNC: 11.3 G/DL (ref 13.3–17.7)
IMM GRANULOCYTES # BLD: 0 10E9/L (ref 0–0.4)
IMM GRANULOCYTES NFR BLD: 0.2 %
LYMPHOCYTES # BLD AUTO: 1.2 10E9/L (ref 0.8–5.3)
LYMPHOCYTES NFR BLD AUTO: 13 %
MCH RBC QN AUTO: 29.7 PG (ref 26.5–33)
MCHC RBC AUTO-ENTMCNC: 32.8 G/DL (ref 31.5–36.5)
MCV RBC AUTO: 90 FL (ref 78–100)
MONOCYTES # BLD AUTO: 0.7 10E9/L (ref 0–1.3)
MONOCYTES NFR BLD AUTO: 7.5 %
NEUTROPHILS # BLD AUTO: 7.3 10E9/L (ref 1.6–8.3)
NEUTROPHILS NFR BLD AUTO: 77.4 %
NRBC # BLD AUTO: 0 10*3/UL
NRBC BLD AUTO-RTO: 0 /100
PHOSPHATE SERPL-MCNC: 1.8 MG/DL (ref 2.5–4.5)
PLATELET # BLD AUTO: 225 10E9/L (ref 150–450)
POTASSIUM SERPL-SCNC: 3.9 MMOL/L (ref 3.4–5.3)
RBC # BLD AUTO: 3.64 10E12/L (ref 4.4–5.9)
SODIUM SERPL-SCNC: 141 MMOL/L (ref 133–144)
WBC # BLD AUTO: 9.4 10E9/L (ref 4–11)

## 2019-01-22 PROCEDURE — 85018 HEMOGLOBIN: CPT | Performed by: PHYSICIAN ASSISTANT

## 2019-01-22 PROCEDURE — 25000131 ZZH RX MED GY IP 250 OP 636 PS 637: Performed by: INTERNAL MEDICINE

## 2019-01-22 PROCEDURE — 36415 COLL VENOUS BLD VENIPUNCTURE: CPT | Performed by: PHYSICIAN ASSISTANT

## 2019-01-22 PROCEDURE — 25000132 ZZH RX MED GY IP 250 OP 250 PS 637: Performed by: INTERNAL MEDICINE

## 2019-01-22 PROCEDURE — 36415 COLL VENOUS BLD VENIPUNCTURE: CPT | Performed by: INTERNAL MEDICINE

## 2019-01-22 PROCEDURE — 00000146 ZZHCL STATISTIC GLUCOSE BY METER IP

## 2019-01-22 PROCEDURE — 25000128 H RX IP 250 OP 636: Performed by: PHYSICIAN ASSISTANT

## 2019-01-22 PROCEDURE — 85025 COMPLETE CBC W/AUTO DIFF WBC: CPT | Performed by: INTERNAL MEDICINE

## 2019-01-22 PROCEDURE — 25000132 ZZH RX MED GY IP 250 OP 250 PS 637: Performed by: PHYSICIAN ASSISTANT

## 2019-01-22 PROCEDURE — 25000128 H RX IP 250 OP 636: Performed by: INTERNAL MEDICINE

## 2019-01-22 PROCEDURE — 80069 RENAL FUNCTION PANEL: CPT | Performed by: INTERNAL MEDICINE

## 2019-01-22 PROCEDURE — 12000000 ZZH R&B MED SURG/OB

## 2019-01-22 PROCEDURE — 99232 SBSQ HOSP IP/OBS MODERATE 35: CPT | Performed by: INTERNAL MEDICINE

## 2019-01-22 PROCEDURE — 25000125 ZZHC RX 250: Performed by: INTERNAL MEDICINE

## 2019-01-22 RX ORDER — DEXTROSE MONOHYDRATE 25 G/50ML
25-50 INJECTION, SOLUTION INTRAVENOUS
Status: DISCONTINUED | OUTPATIENT
Start: 2019-01-22 | End: 2019-01-23 | Stop reason: HOSPADM

## 2019-01-22 RX ORDER — NICOTINE POLACRILEX 4 MG
15-30 LOZENGE BUCCAL
Status: DISCONTINUED | OUTPATIENT
Start: 2019-01-22 | End: 2019-01-23 | Stop reason: HOSPADM

## 2019-01-22 RX ADMIN — ACETAMINOPHEN 975 MG: 325 TABLET, FILM COATED ORAL at 16:19

## 2019-01-22 RX ADMIN — LEVOTHYROXINE SODIUM 75 MCG: 75 TABLET ORAL at 08:47

## 2019-01-22 RX ADMIN — GABAPENTIN 700 MG: 100 CAPSULE ORAL at 20:39

## 2019-01-22 RX ADMIN — LISINOPRIL 5 MG: 5 TABLET ORAL at 08:47

## 2019-01-22 RX ADMIN — ACETAMINOPHEN 975 MG: 325 TABLET, FILM COATED ORAL at 08:46

## 2019-01-22 RX ADMIN — SENNOSIDES AND DOCUSATE SODIUM 2 TABLET: 8.6; 5 TABLET ORAL at 08:47

## 2019-01-22 RX ADMIN — SODIUM PHOSPHATE, MONOBASIC, MONOHYDRATE 15 MMOL: 276; 142 INJECTION, SOLUTION INTRAVENOUS at 10:24

## 2019-01-22 RX ADMIN — SODIUM CHLORIDE: 9 INJECTION, SOLUTION INTRAVENOUS at 06:52

## 2019-01-22 RX ADMIN — RANITIDINE 150 MG: 150 TABLET ORAL at 18:42

## 2019-01-22 RX ADMIN — SODIUM CHLORIDE: 9 INJECTION, SOLUTION INTRAVENOUS at 18:47

## 2019-01-22 RX ADMIN — ENOXAPARIN SODIUM 40 MG: 40 INJECTION SUBCUTANEOUS at 08:49

## 2019-01-22 RX ADMIN — OXYCODONE HYDROCHLORIDE 5 MG: 5 TABLET ORAL at 20:38

## 2019-01-22 RX ADMIN — GABAPENTIN 300 MG: 300 CAPSULE ORAL at 08:47

## 2019-01-22 RX ADMIN — RANITIDINE 150 MG: 150 TABLET ORAL at 05:35

## 2019-01-22 RX ADMIN — ACETAMINOPHEN 975 MG: 325 TABLET, FILM COATED ORAL at 00:37

## 2019-01-22 RX ADMIN — SIMVASTATIN 80 MG: 40 TABLET, FILM COATED ORAL at 20:40

## 2019-01-22 RX ADMIN — INSULIN GLARGINE 35 UNITS: 100 INJECTION, SOLUTION SUBCUTANEOUS at 08:46

## 2019-01-22 RX ADMIN — SENNOSIDES AND DOCUSATE SODIUM 2 TABLET: 8.6; 5 TABLET ORAL at 20:40

## 2019-01-22 RX ADMIN — INSULIN GLARGINE 35 UNITS: 100 INJECTION, SOLUTION SUBCUTANEOUS at 22:05

## 2019-01-22 ASSESSMENT — ACTIVITIES OF DAILY LIVING (ADL)
ADLS_ACUITY_SCORE: 14

## 2019-01-22 NOTE — PLAN OF CARE
A&O. VSS. Lap sites, CDI. Abdominal incision drainage marked no change. KAILEY stripped, CDI. Standby assist. PCA pump for pain. Clear liquid diet. Ambulated once in hallway.

## 2019-01-22 NOTE — PROGRESS NOTES
[facility]    Hospitalist Progress Note    Brief Summary:   This is a 59-year-old male with history of diabetes mellitus since 2000, poorly controlled, obesity, hypothyroidism, hypertension, neuropathy, history of pancreatitis on Victoza, vitamin D deficiency, history of ventral hernia for long time, was admitted with complaint of abdominal pain, unable to push his hernia bag, nausea and vomiting, found to be incarcerated ventral hernia.  He was taken to the OR by General Surgery emergently and had ventral hernia repair laparoscopically, lysis of adhesions and primary closure.  Postoperatively, Hospice consult was given for diabetes management.       Assessment & Plan     1.  Incarcerated ventral hernia, status post laparoscopic ventral hernia repair, lysis of adhesion and open primary closure:  Further management as per the General Surgery team.  He tolerated the surgery well.  Pain control with PCA at this time.   Consider stopping the PCA, ambulate the patient.    2.  Diabetes mellitus type 2:  Uncontrolled on admission now much better controlled. The patient has difficult to control diabetes.  He takes U-500 70 units b.i.d., metformin 1000 in the morning, 500 in the evening, and Actos.  At this time, we are holding metformin and Actos while in the hospital, hold U-500 70 b.i.d. as well given his only clear liquid diet .  I started him on Lantus 24 units twice daily and high-dose sliding scale insulin for correction per hypoglycemia protocol.   His blood sugar remained on the higher side and uncontrolled so I did increase the Lantus to 35 units twice daily  and put him on NovoLog 18 units with the meal plus sliding scale insulin high-dose on 1/21/2018,  blood sugar is much better controlled now. Once he starts eating and improves oral intake, we will advance his insulin.  Maybe use U-500 again.      3.  Hypertension:   Now it is much better control  with lisinopril 5 mg daily at this time.       4.  Acute renal failure:  The patient admitted with a creatinine of 1.4, baseline less than 1, most likely because of nausea, vomiting.  Is now resolved at this time.   5.  Hypothyroidism:  On levothyroxine.  We will continue that.   6.  History of diabetic neuropathy:  On gabapentin.  I agree with continuing with the medication.   7.  Hyperlipidemia:  On Zocor.  We will continue that once he starts taking orally.     8.  Thirst with lactic acidosis resolved now.  This is most likely because of the incarcerated hernia.     9.  Deep venous prophylaxis:  With Lovenox.  I agree with that.   10.  Morbid obesity, with sleep apnea diabetes and hypertension: IV in the future the patient will benefit from weight loss if unable to do significant weight loss I recommended him that he should consider bariatric surgery    Continue same dose of insulin regimen at this time.  Sugar is in good control at this time.     Discussed with the nursing staff taking care of the patient.     MD Yousif GILLETTE MD  Text Page  (7am - 6pm)    Interval History   Not passing any gas or had any bowel movement yet.  Papi pain is in good control.  No other active complaint    No other significant event overnight.    -Data reviewed today: I reviewed all new labs and imaging results over the last 24 hours. I personally reviewed no images or EKG's today.    Physical Exam   Temp: 98.6  F (37  C) Temp src: Oral BP: 145/83 Pulse: 72 Heart Rate: 77 Resp: 18 SpO2: 96 % O2 Device: None (Room air)    Vitals:    01/19/19 2144   Weight: 131.5 kg (290 lb)     Vital Signs with Ranges  Temp:  [97.5  F (36.4  C)-99.1  F (37.3  C)] 98.6  F (37  C)  Pulse:  [72-76] 72  Heart Rate:  [72-77] 77  Resp:  [16-20] 18  BP: (135-148)/(67-83) 145/83  SpO2:  [93 %-96 %] 96 %  I/O last 3 completed shifts:  In: 3379.17 [P.O.:750; I.V.:2629.17]  Out: 1565 [Urine:1475; Drains:90]    Constitutional: awake, alert, cooperative, no apparent distress,  and appears stated age  Eyes: Lids and lashes normal, pupils equal, round and reactive to light, extra ocular muscles intact, sclera clear, conjunctiva normal  Respiratory: diminished breath sounds bilaterally  Cardiovascular: Normal apical impulse, regular rate and rhythm, normal S1 and S2, no S3 or S4, and no murmur noted  GI: No scars,  bowel sounds positive, soft, non-distended, mild tenderness, dressing in place, no masses palpated, no hepatosplenomegally  Skin: no bruising or bleeding  Musculoskeletal: there is no redness, warmth, or swelling of the joints  Neurologic: No focal deficit    Medications     HYDROmorphone       sodium chloride 125 mL/hr at 01/22/19 0853       acetaminophen  975 mg Oral Q8H     enoxaparin  40 mg Subcutaneous Q24H     ranitidine  150 mg Oral Q12H    Or     famotidine  20 mg Intravenous Q12H     gabapentin  300 mg Oral QAM     gabapentin  700 mg Oral QPM     insulin aspart  1-10 Units Subcutaneous TID AC     insulin aspart  1-7 Units Subcutaneous At Bedtime     insulin aspart  18 Units Subcutaneous TID w/meals     insulin glargine  35 Units Subcutaneous BID     levothyroxine  75 mcg Oral Daily     lisinopril  5 mg Oral Daily     senna-docusate  1 tablet Oral BID    Or     senna-docusate  2 tablet Oral BID     simvastatin  80 mg Oral At Bedtime     sodium chloride (PF)  3 mL Intracatheter Q8H     sodium phosphate  15 mmol Intravenous Once       Data   Recent Labs   Lab 01/22/19  0727 01/20/19  0635 01/19/19  2154   WBC 9.4  --  17.4*   HGB 10.8*  --  14.9   MCV 90  --  89    294 303   INR  --   --  1.00     --  138   POTASSIUM 3.9  --  4.2   CHLORIDE 108  --  103   CO2 26  --  23   BUN 12  --  34*   CR 0.96 1.00 1.40*   ANIONGAP 7  --  12   DELBERT 8.4*  --  10.3*   *  --  231*   ALBUMIN 2.6*  --  4.4   PROTTOTAL  --   --  8.5   BILITOTAL  --   --  0.5   ALKPHOS  --   --  53   ALT  --   --  31   AST  --   --  14   LIPASE  --   --  50*       No results found for this or  any previous visit (from the past 24 hour(s)).

## 2019-01-22 NOTE — PLAN OF CARE
A&Ox4.  VSS except BP-154/82.  Afebrile.  Lapsites to abdomen-CDI.  Dressing to midline abdominal incision changed.  Staples to midline abdominal incisions intact.  BS hypoactive.  Denies passing gas.  Tolerating clear liquids.  KAILEY drain intact and stripped.  On Dilaudid PCA pump for pain.  Rating pain at 5/10 but states tolerable.  Ambulated in hallway.  Voiding in bathroom.

## 2019-01-22 NOTE — PROGRESS NOTES
"Surgery    Improving.   No bowel function yet.   Walking a little more  Using dilaudid at night.    Gen:  Awake, Alert, NAD  Blood pressure 145/83, pulse 72, temperature 98.6  F (37  C), temperature source Oral, resp. rate 18, height 1.778 m (5' 10\"), weight 131.5 kg (290 lb), SpO2 96 %.  Resp - clear to ascultation.    Cardiac - Regular rate & rhythm without murmur  Abdomen - soft, appropriately tender.  Incisions healing appropriately without erythema or purulent drainage. Staples in place. Drain site ok  Extremities - no lower extremity edema.     UOP  1475 yesterday  Drain 90 s/sang    A/P   - give suppository  - walk  - recheck hgb, patient without any symptoms of anemia. Relatively small blood loss during op. Drain output mostly serous now.     Colten Wiggins PA-C  Office: [phone]  Pager: [phone]    "

## 2019-01-22 NOTE — PLAN OF CARE
A&O, VSS, Lung sounds clear, Bowel sounds hypoactive, -flatus, adequate urine output, 3 lap sites CDI. Abdominal incision marked w/ scant drainage. KAILEY drain to bulb suction w/ serosanguinous output and stripped per order. Ambulates standby assist. Ambulated nj x1.  Tolerating clear liquid diet. Pain controlled by PCA pump. Q4 blood sugars 177,161.

## 2019-01-23 VITALS
TEMPERATURE: 97.9 F | WEIGHT: 290 LBS | RESPIRATION RATE: 16 BRPM | OXYGEN SATURATION: 95 % | BODY MASS INDEX: 41.52 KG/M2 | HEIGHT: 70 IN | SYSTOLIC BLOOD PRESSURE: 143 MMHG | DIASTOLIC BLOOD PRESSURE: 73 MMHG | HEART RATE: 72 BPM

## 2019-01-23 LAB
CREAT SERPL-MCNC: 0.89 MG/DL (ref 0.66–1.25)
GFR SERPL CREATININE-BSD FRML MDRD: >90 ML/MIN/{1.73_M2}
GLUCOSE BLDC GLUCOMTR-MCNC: 108 MG/DL (ref 70–99)
GLUCOSE BLDC GLUCOMTR-MCNC: 129 MG/DL (ref 70–99)
GLUCOSE BLDC GLUCOMTR-MCNC: 137 MG/DL (ref 70–99)
PLATELET # BLD AUTO: 274 10E9/L (ref 150–450)

## 2019-01-23 PROCEDURE — 25000132 ZZH RX MED GY IP 250 OP 250 PS 637: Performed by: INTERNAL MEDICINE

## 2019-01-23 PROCEDURE — 99232 SBSQ HOSP IP/OBS MODERATE 35: CPT | Performed by: INTERNAL MEDICINE

## 2019-01-23 PROCEDURE — 25000132 ZZH RX MED GY IP 250 OP 250 PS 637: Performed by: PHYSICIAN ASSISTANT

## 2019-01-23 PROCEDURE — 00000146 ZZHCL STATISTIC GLUCOSE BY METER IP

## 2019-01-23 PROCEDURE — 85049 AUTOMATED PLATELET COUNT: CPT | Performed by: PHYSICIAN ASSISTANT

## 2019-01-23 PROCEDURE — 36415 COLL VENOUS BLD VENIPUNCTURE: CPT | Performed by: PHYSICIAN ASSISTANT

## 2019-01-23 PROCEDURE — 82565 ASSAY OF CREATININE: CPT | Performed by: PHYSICIAN ASSISTANT

## 2019-01-23 PROCEDURE — 25000128 H RX IP 250 OP 636: Performed by: PHYSICIAN ASSISTANT

## 2019-01-23 PROCEDURE — 25000131 ZZH RX MED GY IP 250 OP 636 PS 637: Performed by: INTERNAL MEDICINE

## 2019-01-23 RX ORDER — AMOXICILLIN 250 MG
1-2 CAPSULE ORAL 2 TIMES DAILY PRN
Qty: 60 TABLET | Refills: 0 | Status: SHIPPED | OUTPATIENT
Start: 2019-01-23 | End: 2019-02-22

## 2019-01-23 RX ORDER — OXYCODONE HYDROCHLORIDE 5 MG/1
5-10 TABLET ORAL EVERY 6 HOURS PRN
Qty: 15 TABLET | Refills: 0 | Status: SHIPPED | OUTPATIENT
Start: 2019-01-23 | End: 2019-01-26

## 2019-01-23 RX ORDER — OXYMETAZOLINE HYDROCHLORIDE 0.05 G/100ML
2 SPRAY NASAL ONCE
Status: DISCONTINUED | OUTPATIENT
Start: 2019-01-23 | End: 2019-01-23

## 2019-01-23 RX ADMIN — GABAPENTIN 300 MG: 300 CAPSULE ORAL at 08:56

## 2019-01-23 RX ADMIN — ENOXAPARIN SODIUM 40 MG: 40 INJECTION SUBCUTANEOUS at 08:55

## 2019-01-23 RX ADMIN — INSULIN GLARGINE 35 UNITS: 100 INJECTION, SOLUTION SUBCUTANEOUS at 09:50

## 2019-01-23 RX ADMIN — SODIUM CHLORIDE: 9 INJECTION, SOLUTION INTRAVENOUS at 03:03

## 2019-01-23 RX ADMIN — SENNOSIDES AND DOCUSATE SODIUM 2 TABLET: 8.6; 5 TABLET ORAL at 08:55

## 2019-01-23 RX ADMIN — OXYCODONE HYDROCHLORIDE 5 MG: 5 TABLET ORAL at 00:43

## 2019-01-23 RX ADMIN — LEVOTHYROXINE SODIUM 75 MCG: 75 TABLET ORAL at 08:56

## 2019-01-23 RX ADMIN — RANITIDINE 150 MG: 150 TABLET ORAL at 05:19

## 2019-01-23 RX ADMIN — LISINOPRIL 5 MG: 5 TABLET ORAL at 08:56

## 2019-01-23 ASSESSMENT — ACTIVITIES OF DAILY LIVING (ADL)
ADLS_ACUITY_SCORE: 14

## 2019-01-23 NOTE — PROGRESS NOTES
[facility]    Hospitalist Progress Note    Brief Summary:   This is a 59-year-old male with history of diabetes mellitus since 2000, poorly controlled, obesity, hypothyroidism, hypertension, neuropathy, history of pancreatitis on Victoza, vitamin D deficiency, history of ventral hernia for long time, was admitted with complaint of abdominal pain, unable to push his hernia bag, nausea and vomiting, found to be incarcerated ventral hernia.  He was taken to the OR by General Surgery emergently and had ventral hernia repair laparoscopically, lysis of adhesions and primary closure.  Postoperatively, Hospice consult was given for diabetes management.       Assessment & Plan     1.  Incarcerated ventral hernia, status post laparoscopic ventral hernia repair, lysis of adhesion and open primary closure:  Further management as per the General Surgery team.  He tolerated the surgery well.  Pain control    2.  Diabetes mellitus type 2:  Uncontrolled on admission now much better controlled. The patient has difficult to control diabetes.  He takes U-500 70 units b.i.d., metformin 1000 in the morning, 500 in the evening, and Actos.  At this time, we are holding metformin and Actos while in the hospital, hold U-500 70 b.i.d. as well given his only clear liquid diet .  I started him on Lantus 24 units twice daily and high-dose sliding scale insulin for correction per hypoglycemia protocol.   His blood sugar remained on the higher side and uncontrolled so I did increase the Lantus to 35 units twice daily  and put him on NovoLog 18 units with the meal plus sliding scale insulin high-dose on 1/21/2018,  blood sugar is much better controlled now. Once he starts eating and improves oral intake, we will advance his insulin.  Maybe use U-500 again.      3.  Hypertension:   Now it is much better control  with lisinopril 5 mg daily at this time.      4.  Acute renal failure:  The patient admitted with a creatinine of 1.4,  baseline less than 1, most likely because of nausea, vomiting.  Is now resolved at this time.   5.  Hypothyroidism:  On levothyroxine.  We will continue that.   6.  History of diabetic neuropathy:  On gabapentin.  I agree with continuing with the medication.   7.  Hyperlipidemia:  On Zocor.  We will continue that once he starts taking orally.     8.  Thirst with lactic acidosis resolved now.  This is most likely because of the incarcerated hernia.     9.  Deep venous prophylaxis:  With Lovenox.  I agree with that.   10.  Morbid obesity, with sleep apnea diabetes and hypertension: IV in the future the patient will benefit from weight loss if unable to do significant weight loss I recommended him that he should consider bariatric surgery    Continue same dose of insulin regimen at this time.  Diabetes is in good control at this time.     Discussed with the nursing staff taking care of the patient.     MD Yousif GILLETTE MD  Text Page  (7am - 6pm)    Interval History   Patient is passing gas since yesterday feeling better, no bowel movement yet.  Denies any chest pain shortness of breath orthopnea PND palpitation no fever chills or cough.    No other significant event overnight.    -Data reviewed today: I reviewed all new labs and imaging results over the last 24 hours. I personally reviewed no images or EKG's today.    Physical Exam   Temp: 98  F (36.7  C) Temp src: Oral BP: 158/83 Pulse: 72 Heart Rate: 62 Resp: 16 SpO2: 94 % O2 Device: BiPAP/CPAP    Vitals:    01/19/19 2144   Weight: 131.5 kg (290 lb)     Vital Signs with Ranges  Temp:  [97.9  F (36.6  C)-98.4  F (36.9  C)] 98  F (36.7  C)  Pulse:  [72] 72  Heart Rate:  [62-79] 62  Resp:  [16] 16  BP: (150-163)/(70-92) 158/83  SpO2:  [94 %-100 %] 94 %  I/O last 3 completed shifts:  In: 1582 [P.O.:770; I.V.:812]  Out: 3110 [Urine:3025; Drains:85]    Constitutional: awake, alert, cooperative, no apparent distress, and appears stated  age  Eyes: Lids and lashes normal, pupils equal, round and reactive to light, extra ocular muscles intact, sclera clear, conjunctiva normal  Respiratory: diminished breath sounds bilaterally  Cardiovascular: Normal apical impulse, regular rate and rhythm, normal S1 and S2, no S3 or S4, and no murmur noted  GI: No scars,  bowel sounds positive, soft, non-distended, mild tenderness, dressing in place, no masses palpated, no hepatosplenomegally  Skin: no bruising or bleeding  Musculoskeletal: there is no redness, warmth, or swelling of the joints  Neurologic: No focal deficit    Medications     sodium chloride 125 mL/hr at 01/23/19 0903       enoxaparin  40 mg Subcutaneous Q24H     ranitidine  150 mg Oral Q12H    Or     famotidine  20 mg Intravenous Q12H     gabapentin  300 mg Oral QAM     gabapentin  700 mg Oral QPM     insulin aspart  1-10 Units Subcutaneous TID AC     insulin aspart  1-7 Units Subcutaneous At Bedtime     insulin aspart  18 Units Subcutaneous TID w/meals     insulin glargine  35 Units Subcutaneous BID     levothyroxine  75 mcg Oral Daily     lisinopril  5 mg Oral Daily     senna-docusate  1 tablet Oral BID    Or     senna-docusate  2 tablet Oral BID     simvastatin  80 mg Oral At Bedtime     sodium chloride (PF)  3 mL Intracatheter Q8H       Data   Recent Labs   Lab 01/23/19  0735 01/22/19  1312 01/22/19  0727 01/20/19  0635 01/19/19  2154   WBC  --   --  9.4  --  17.4*   HGB  --  11.3* 10.8*  --  14.9   MCV  --   --  90  --  89     --  225 294 303   INR  --   --   --   --  1.00   NA  --   --  141  --  138   POTASSIUM  --   --  3.9  --  4.2   CHLORIDE  --   --  108  --  103   CO2  --   --  26  --  23   BUN  --   --  12  --  34*   CR 0.89  --  0.96 1.00 1.40*   ANIONGAP  --   --  7  --  12   DELBERT  --   --  8.4*  --  10.3*   GLC  --   --  154*  --  231*   ALBUMIN  --   --  2.6*  --  4.4   PROTTOTAL  --   --   --   --  8.5   BILITOTAL  --   --   --   --  0.5   ALKPHOS  --   --   --   --  53   ALT   --   --   --   --  31   AST  --   --   --   --  14   LIPASE  --   --   --   --  50*       No results found for this or any previous visit (from the past 24 hour(s)).

## 2019-01-23 NOTE — DISCHARGE INSTRUCTIONS
[facility] [place] [facility]  Discharge Instructions: Post-Operative Open Inguinal Hernia    ACTIVITY    Increase your activity gradually.  Avoid strenuous physical activity or heavy lifting greater than 15 lbs. for 3-4 weeks.  You may climb stairs.    You may drive without restrictions when you are not using any prescription pain medication and comfortable in a car.    You may return to work/school when you are comfortable without any prescription pain medication.    WOUND CARE    You may remove your bandage and shower 48 hours after the surgery.  Pat your incisions dry and leave open to air.  Re-apply dressing (Band-aid or gauze/tape) as needed for drainage.    You may have steri-strips (looks like tape) or Dermabond (looks like glue) on your incision.  Leave it alone, it will peel up and fall off on its own.     Do not soak your incisions in a tub or pool for 2 weeks.     DIET    Return to the diet you were on before surgery.    Pain medications can cause constipation.  Limit use when possible.  Take over the counter stool softener/stimulant, such as Colace or Senna, with plenty of water.      PAIN    Expect some tenderness and discomfort at the incision site(s).  Use the prescribed pain medication at your discretion.  Expect gradual resolution of your pain over several days.    You may take ibuprofen with food (unless you have been told not to) instead of or in addition to your prescribed pain medication.  If you are taking Norco or Percocet, do not take any additional acetaminophen/APAP/Tylenol.    Do not drink alcohol or drive while you are taking pain medications.    You may apply ice to your incisions in 20 minute intervals as needed for the next 48 hours.  After that time, consider switching to heat if you prefer.    EXPECTATIONS    A lump or ridge under the incision is normal and will gradually resolve.    ***You can expect some swelling, bruising possibly involving the testicles and  penis, and/or some numbness.  These symptoms are expected.  Use ice to help with the swelling.  Try placing a rolled hand towel below your scrotum to help alleviate your scrotal discomfort.     RETURN APPOINTMENT    Follow up with your surgeon or a PA next week to have the drain removed.  Please call the office at [phone] to schedule your appointment.    CALL OUR OFFICE IF YOU HAVE:     Chills or fever above 101.5 F.    Increased redness or drainage at your incisions.    Significant bleeding.    Pain not relieved by your pain medication or rest.    Increasing pain after the first 48 hours.    Any other concerns or questions.

## 2019-01-23 NOTE — PROGRESS NOTES
Overall looks good  Reports that he is passing flatus  Tolerating clear liquids without nausea  Pain control adequate  Afebrile with normal vital signs  Abdomen soft and benign, KAILEY with minimal serous output    Assessment/plan: Status post repair of incarcerated ventral hernia  We will advance diet  If tolerating perhaps discharged later today  Leave KAILEY in and remove sometime next week

## 2019-01-23 NOTE — PLAN OF CARE
A&O x4.  Elevated BPs on RA, CPAP @ HS.  Dressing CDI.  KAILEY in place, draining adequately.  Bowel sounds hypoactive, passing flatus.  Reports pain, managed with Tylenol and oxycodone.  Clear liquid diet, tolerating well.  Up with assist x1.  Continue to monitor.

## 2019-01-23 NOTE — PLAN OF CARE
A&Ox4, VSS ex. HTN, LS clear, Bowel sounds hypoactive, +flatus, adequate urine output, 3 lap sites CDI. Abdominal incision CDI, lap sites CDI. KAILEY drain stripped and CDI.  Ambulates standby assist.  Tolerating clear liquid diet. Pain controlled PRN oxy. Continue to monitor.

## 2019-01-23 NOTE — PLAN OF CARE
A/Ox4. AVSS. BS-hypo, +flatus. LS-clear. 4 lap sites and dressing CDI. KAILEY drain on left side of abdomen, 15cc out this shift. Reg Diet. Attempting to clarify discharge plan. SBA.

## 2019-01-23 NOTE — DISCHARGE SUMMARY
[facility] Discharge Summary    Maurice Quiñones MRN# [id number]   Age: 59 year old YOB: 1960     Date of Admission:  1/19/2019  Date of Discharge:  1/23/2019  Admitting Provider:  Michael Fenton MD  Discharge Provider:  Colten Wiggins PA-C  Discharging Service: General Surgery     Primary Provider: Wu Phillips  Primary Care Physician Phone Number: [phone]          Admission Diagnoses:   Principle Diagnosis: Strangulated ventral hernia [K43.6]  Ventral hernia with obstruction [K43.6]  Secondary Diagnoses:  Morbid obesity  Poorly controlled DM2  HTN  ARF  Hypothyroidism  Hyperlipidemia  Lactic acidosis  AISHA       Discharge Diagnosis:   INCARCERATED HERNIA          Procedures:   Procedure(s): Combined open primary closure and Laparoscopic repair of ventral hernia.  Lysis of intra-abdominal adhesions            Discharge Medications:     Discharge Medication List as of 1/23/2019  5:13 PM      START taking these medications    Details   oxyCODONE (ROXICODONE) 5 MG tablet Take 1-2 tablets (5-10 mg) by mouth every 6 hours as needed for moderate to severe pain, Disp-15 tablet, R-0, Local Print      senna-docusate (SENOKOT-S/PERICOLACE) 8.6-50 MG tablet Take 1-2 tablets by mouth 2 times daily as needed for constipation, Disp-60 tablet, R-0, E-Prescribe         CONTINUE these medications which have NOT CHANGED    Details   Cholecalciferol 4000 units CAPS Take 4,000 Units by mouth daily, Historical      fenofibrate 160 MG tablet Take 1 tablet by mouth daily, Historical      !! gabapentin (NEURONTIN) 300 MG capsule Take 700 mg by mouth every evening Patient takes 2 x 300mg + 100mg capsule for a total of 700mg at bedtime., Historical      !! gabapentin (NEURONTIN) 300 MG capsule Take 300 mg by mouth every morning , Historical      Ibuprofen-Diphenhydramine Cit (ADVIL PM) 200-38 MG TABS Take 3 tablets by mouth At Bedtime, Historical      insulin reg HIGH CONC (HUMULIN R U-500 KWIKPEN)  500 UNIT/ML PEN soln Inject 70 Units Subcutaneous 2 times daily (before meals), Historical      levothyroxine (SYNTHROID/LEVOTHROID) 75 MCG tablet Take 75 mcg by mouth daily, Historical      lisinopril (PRINIVIL,ZESTRIL) 5 MG tablet Take 5 mg by mouth daily, Historical      !! metFORMIN (GLUCOPHAGE) 1000 MG tablet Take 1,000 mg by mouth daily (with breakfast), Historical      !! metFORMIN (GLUCOPHAGE) 500 MG tablet Take 500 mg by mouth daily (with dinner), Historical      pioglitazone (ACTOS) 30 MG tablet Take 30 mg by mouth daily, Historical      PSYLLIUM HUSK PO Take 4 tablets by mouth daily , Historical      simvastatin (ZOCOR) 80 MG tablet Take 80 mg by mouth At Bedtime , Historical      Vitamin Mixture (ANIYAH-C PO) Take 1 tablet by mouth daily, Historical       !! - Potential duplicate medications found. Please discuss with provider.              Allergies:       No Known Allergies          Brief History of Illness:   Maurice Quiñones had a remote history of umbilical hernia repair with recurrence that has been present for a decade. He developed pain and tightness of his stomach and presented to the ED on 1/20/19.  There was omentum and colon incarcerated in the abdominal wall.  Emergent surgery was recommended.  After discussing the risks, benefits, and possible complications, informed consent was obtained and the patient underwent the above procedure.  There were no complications.  Please see the Operative Report for full details.           Hospital Course:   Maurice Quiñones's hospital course was unremarkable.  He recovered as anticipated and experienced no post-operative complications.  Medical management by the hospital service was critical due to the patient's multiple medical issues including uncontrolled DM2, HTN, Renal insufficiency. A drain that was placed intraoperatively was continued at discharge and will be reassessed in the surgical clinic next week.     On the date of discharge, the  "patient was discharged to home in stable condition and afebrile.  He verbalized understanding of all discharge instructions and felt comfortable with the discharge plan.  He was asked to call with any further questions or concerns.         Condition on Discharge:      Discharge condition: Stable   Discharge vitals: Blood pressure 158/83, pulse 72, temperature 98  F (36.7  C), temperature source Oral, resp. rate 16, height 1.778 m (5' 10\"), weight 131.5 kg (290 lb), SpO2 94 %.           Discharge Disposition:   Discharged to home          Discharge Instructions and Follow-Up:      Maurice Quiñones was asked to follow up with surgical team in 1-2 weeks.      Discharge Medications:   Current Discharge Medication List      START taking these medications    Details   oxyCODONE (ROXICODONE) 5 MG tablet Take 1-2 tablets (5-10 mg) by mouth every 6 hours as needed for moderate to severe pain  Qty: 15 tablet, Refills: 0    Associated Diagnoses: Acute post-operative pain      senna-docusate (SENOKOT-S/PERICOLACE) 8.6-50 MG tablet Take 1-2 tablets by mouth 2 times daily as needed for constipation  Qty: 60 tablet, Refills: 0    Associated Diagnoses: Incarcerated ventral hernia         CONTINUE these medications which have NOT CHANGED    Details   Cholecalciferol 4000 units CAPS Take 4,000 Units by mouth daily      fenofibrate 160 MG tablet Take 1 tablet by mouth daily      !! gabapentin (NEURONTIN) 300 MG capsule Take 700 mg by mouth every evening Patient takes 2 x 300mg + 100mg capsule for a total of 700mg at bedtime.      !! gabapentin (NEURONTIN) 300 MG capsule Take 300 mg by mouth every morning       Ibuprofen-Diphenhydramine Cit (ADVIL PM) 200-38 MG TABS Take 3 tablets by mouth At Bedtime      insulin reg HIGH CONC (HUMULIN R U-500 KWIKPEN) 500 UNIT/ML PEN soln Inject 70 Units Subcutaneous 2 times daily (before meals)      levothyroxine (SYNTHROID/LEVOTHROID) 75 MCG tablet Take 75 mcg by mouth daily      lisinopril " (PRINIVIL,ZESTRIL) 5 MG tablet Take 5 mg by mouth daily      !! metFORMIN (GLUCOPHAGE) 1000 MG tablet Take 1,000 mg by mouth daily (with breakfast)      !! metFORMIN (GLUCOPHAGE) 500 MG tablet Take 500 mg by mouth daily (with dinner)      pioglitazone (ACTOS) 30 MG tablet Take 30 mg by mouth daily      PSYLLIUM HUSK PO Take 4 tablets by mouth daily       simvastatin (ZOCOR) 80 MG tablet Take 80 mg by mouth At Bedtime       Vitamin Mixture (ANIYAH-C PO) Take 1 tablet by mouth daily       !! - Potential duplicate medications found. Please discuss with provider.          Discharge Instructions:     Follow up with Dr. Fenton at [facility] next week.  Call   [phone] to schedule an appointment.           After Care Instructions     Activity      Avoid heavy lifting (over 20 lbs) and strenuous physical activity for 3 weeks.  Walking is encouraged.         Diet      May resume regular balanced diet as tolerated.         Discharge Instructions      It is ok to shower, but avoid submersing the incision for 2 weeks from surgery.  A cold pack may be applied to the operative site as needed for comfort; alternating 10 minutes on/off.                   Colten Wiggins PA-C   Office: [phone]

## 2019-01-24 NOTE — PROGRESS NOTES
AVSS. Incisions clean, dry, and intact. Instructed patient on dressing cares for incision and KAILEY drain. Demonstrated with teach back how to empty and record drain Patient and wife verbalized all discharge instructions and follow up appointments All questions answered. Patient transported home by wife via car.

## 2019-01-30 ENCOUNTER — OFFICE VISIT (OUTPATIENT)
Dept: SURGERY | Facility: CLINIC | Age: 59
End: 2019-01-30
Payer: COMMERCIAL

## 2019-01-30 DIAGNOSIS — Z09 SURGERY FOLLOW-UP EXAMINATION: Primary | ICD-10-CM

## 2019-01-30 PROCEDURE — 99024 POSTOP FOLLOW-UP VISIT: CPT | Performed by: SURGERY

## 2020-02-23 ENCOUNTER — HEALTH MAINTENANCE LETTER (OUTPATIENT)
Age: 60
End: 2020-02-23

## 2020-10-20 ENCOUNTER — OFFICE VISIT (OUTPATIENT)
Dept: OPHTHALMOLOGY | Facility: CLINIC | Age: 60
End: 2020-10-20
Attending: OPHTHALMOLOGY
Payer: COMMERCIAL

## 2020-10-20 DIAGNOSIS — H17.9 CORNEAL SCAR, RIGHT EYE: ICD-10-CM

## 2020-10-20 DIAGNOSIS — Z98.890 H/O LASER ASSISTED IN SITU KERATOMILEUSIS: ICD-10-CM

## 2020-10-20 DIAGNOSIS — E08.3293 MILD NONPROLIFERATIVE DIABETIC RETINOPATHY OF BOTH EYES WITHOUT MACULAR EDEMA ASSOCIATED WITH DIABETES MELLITUS DUE TO UNDERLYING CONDITION (H): Primary | ICD-10-CM

## 2020-10-20 DIAGNOSIS — H04.123 DRY EYES, BILATERAL: ICD-10-CM

## 2020-10-20 PROBLEM — E66.01 MORBID OBESITY WITH BMI OF 40.0-44.9, ADULT (H): Status: ACTIVE | Noted: 2018-01-21

## 2020-10-20 PROBLEM — E78.5 HYPERLIPIDEMIA: Status: ACTIVE | Noted: 2020-10-20

## 2020-10-20 PROBLEM — E16.2 HYPOGLYCEMIA: Status: ACTIVE | Noted: 2018-01-21

## 2020-10-20 PROBLEM — M17.9 OA (OSTEOARTHRITIS) OF KNEE: Status: ACTIVE | Noted: 2020-02-13

## 2020-10-20 PROCEDURE — G0463 HOSPITAL OUTPT CLINIC VISIT: HCPCS

## 2020-10-20 PROCEDURE — 92014 COMPRE OPH EXAM EST PT 1/>: CPT | Performed by: OPHTHALMOLOGY

## 2020-10-20 ASSESSMENT — VISUAL ACUITY
OD_SC: 20/40
OS_SC: 20/40
OD_SC+: -2
OD_PH_SC: 20/25
OS_PH_SC: 20/30
OD_PH_SC+: -2
METHOD: SNELLEN - LINEAR
OS_SC+: -2

## 2020-10-20 ASSESSMENT — CONF VISUAL FIELD
OD_NORMAL: 1
OS_NORMAL: 1

## 2020-10-20 ASSESSMENT — TONOMETRY
IOP_METHOD: TONOPEN
OD_IOP_MMHG: 20
OS_IOP_MMHG: 21

## 2020-10-20 ASSESSMENT — REFRACTION_WEARINGRX
OD_SPHERE: +2.50
OS_SPHERE: +2.50

## 2020-10-20 ASSESSMENT — SLIT LAMP EXAM - LIDS
COMMENTS: NORMAL
COMMENTS: NORMAL

## 2020-10-20 ASSESSMENT — EXTERNAL EXAM - RIGHT EYE: OD_EXAM: NORMAL

## 2020-10-20 ASSESSMENT — CUP TO DISC RATIO
OS_RATIO: 0.35
OD_RATIO: 0.4

## 2020-10-20 ASSESSMENT — EXTERNAL EXAM - LEFT EYE: OS_EXAM: NORMAL

## 2020-10-20 NOTE — PROGRESS NOTES
Chief Complaint(s) and History of Present Illness(es)     Diabetic Eye Exam     Vision: fluctuates with blood sugars    Associated symptoms: Negative for itching and headaches    Diabetes Type: Type 2 and on insulin    Duration: Last visit 2/20/18    Treatments tried: no treatments    Pain scale: 0/10         Comments     Lab Results       Component                Value               Date                       A1C                      9.2                 01/20/2019      BS this morning 145 currently 90   Maricruz Flores, COT COT 2:31 PM October 20, 2020                Review of systems for the eyes was negative other than the pertinent positives/negatives listed in the HPI.      Assessment & Plan      Maurice Quiñones is a 60 year old male with the following diagnoses:   1. Mild nonproliferative diabetic retinopathy of both eyes without macular edema associated with diabetes mellitus due to underlying condition (H)    2. Dry eyes, bilateral    3. H/O laser assisted in situ keratomileusis - Both Eyes    4. Corneal scar, right eye         Mild nonproliferative retinopathy, both eyes  Stressed good glycemic and hypertensive control  Monitor yearly     Refraction recommended  Can call for tech eval as needed    Continue Over the counter readers for now  Artificial tears twice a day and as needed       Patient disposition:   Return in about 1 year (around 10/20/2021) for DFE.           Attending Physician Attestation:  Complete documentation of historical and exam elements from today's encounter can be found in the full encounter summary report (not reduplicated in this progress note).  I personally obtained the chief complaint(s) and history of present illness.  I confirmed and edited as necessary the review of systems, past medical/surgical history, family history, social history, and examination findings as documented by others; and I examined the patient myself.  I personally reviewed the relevant tests, images,  and reports as documented above.  I formulated and edited as necessary the assessment and plan and discussed the findings and management plan with the patient and family. . - Evert Reagan MD

## 2020-12-13 ENCOUNTER — HEALTH MAINTENANCE LETTER (OUTPATIENT)
Age: 60
End: 2020-12-13

## 2021-04-11 ENCOUNTER — HEALTH MAINTENANCE LETTER (OUTPATIENT)
Age: 61
End: 2021-04-11

## 2021-06-22 NOTE — PROGRESS NOTES
Patient returns in follow-up after recent combined open and laparoscopic repair of recurrent incarcerated ventral hernia.  He reports that he is doing well.  He has minimal residual discomfort.  He said no fevers or chills.  Reports his bowel function is at baseline.    On examination: His incision is clean and intact.  Staples were removed.  KAILEY drain was also removed.  There is no evidence of hematoma or seroma.    We discussed advancement of activity.  Overall he seems to be doing well.  I have requested ongoing follow-up on an as-needed basis.    Please route or send letter to:  Primary Care Provider (PCP)    
[phone]

## 2021-08-01 ENCOUNTER — HEALTH MAINTENANCE LETTER (OUTPATIENT)
Age: 61
End: 2021-08-01

## 2021-09-26 ENCOUNTER — HEALTH MAINTENANCE LETTER (OUTPATIENT)
Age: 61
End: 2021-09-26

## 2022-03-13 ENCOUNTER — HEALTH MAINTENANCE LETTER (OUTPATIENT)
Age: 62
End: 2022-03-13

## 2022-05-08 ENCOUNTER — HEALTH MAINTENANCE LETTER (OUTPATIENT)
Age: 62
End: 2022-05-08

## 2022-11-10 ENCOUNTER — TELEPHONE (OUTPATIENT)
Dept: OPHTHALMOLOGY | Facility: CLINIC | Age: 62
End: 2022-11-10

## 2022-11-10 NOTE — TELEPHONE ENCOUNTER
LVM for patient to call back and reschedule appointment on 11/5 with general clinic not .Patient was scheduled incorrectly.

## 2022-12-02 ENCOUNTER — OFFICE VISIT (OUTPATIENT)
Dept: OPHTHALMOLOGY | Facility: CLINIC | Age: 62
End: 2022-12-02
Attending: STUDENT IN AN ORGANIZED HEALTH CARE EDUCATION/TRAINING PROGRAM
Payer: COMMERCIAL

## 2022-12-02 DIAGNOSIS — E11.3293 MILD NONPROLIFERATIVE DIABETIC RETINOPATHY OF BOTH EYES WITHOUT MACULAR EDEMA ASSOCIATED WITH TYPE 2 DIABETES MELLITUS (H): Primary | ICD-10-CM

## 2022-12-02 DIAGNOSIS — Z98.890 HX OF LASIK: ICD-10-CM

## 2022-12-02 DIAGNOSIS — H02.831 DERMATOCHALASIS OF BOTH UPPER EYELIDS: ICD-10-CM

## 2022-12-02 DIAGNOSIS — H02.834 DERMATOCHALASIS OF BOTH UPPER EYELIDS: ICD-10-CM

## 2022-12-02 DIAGNOSIS — H04.123 DRY EYES, BILATERAL: ICD-10-CM

## 2022-12-02 PROCEDURE — G0463 HOSPITAL OUTPT CLINIC VISIT: HCPCS

## 2022-12-02 PROCEDURE — 92014 COMPRE OPH EXAM EST PT 1/>: CPT | Performed by: STUDENT IN AN ORGANIZED HEALTH CARE EDUCATION/TRAINING PROGRAM

## 2022-12-02 ASSESSMENT — VISUAL ACUITY
OD_SC: 20/40
OS_SC: 20/50
OS_SC+: +2
OD_SC+: -2
METHOD_MR: PT DEFERRED
OS_PH_SC: 20/30
OD_PH_SC: 20/30
METHOD: SNELLEN - LINEAR

## 2022-12-02 ASSESSMENT — CONF VISUAL FIELD
OD_INFERIOR_NASAL_RESTRICTION: 0
OS_SUPERIOR_TEMPORAL_RESTRICTION: 0
OD_SUPERIOR_NASAL_RESTRICTION: 0
OD_INFERIOR_TEMPORAL_RESTRICTION: 0
OS_INFERIOR_TEMPORAL_RESTRICTION: 0
OD_SUPERIOR_TEMPORAL_RESTRICTION: 0
OS_INFERIOR_NASAL_RESTRICTION: 0
OS_NORMAL: 1
OD_NORMAL: 1
OS_SUPERIOR_NASAL_RESTRICTION: 0

## 2022-12-02 ASSESSMENT — TONOMETRY
OD_IOP_MMHG: 18
IOP_METHOD: TONOPEN
OS_IOP_MMHG: 14

## 2022-12-02 ASSESSMENT — CUP TO DISC RATIO
OS_RATIO: 0.35
OD_RATIO: 0.4

## 2022-12-02 NOTE — PROGRESS NOTES
HPI     Diabetic Eye Exam     Additional comments: Mild nonproliferative diabetic retinopathy of both eyes without macular edema associated with diabetes mellitus due to underlying condition (H) +3 more  Dx           Comments    Pt states no change in VA since last visit  Pt states no flashes, floaters eye pain or redness  Pt states wife is concerned about the droopy eye lids  LBS: 117    Last A1C: 6.8  Lab Results       Component                Value               Date                       A1C                      9.2                 01/20/2019          Georgiana Raegan COT 9:52 AM December 2, 2022                       Last edited by Georgiana Reagan on 12/2/2022  9:52 AM.                     Review of systems for the eyes was negative other than the pertinent positives/negatives listed in the HPI.      Assessment & Plan      Maurice Quiñones is a 60 year old male with the following diagnoses:   1. Mild nonproliferative diabetic retinopathy of both eyes without macular edema associated with type 2 diabetes mellitus (H)    2. Hx of LASIK    3. Dry eyes, bilateral    4. Dermatochalasis of both upper eyelids         T2DM with Mild nonproliferative diabetic retinopathy, both eyes  - Stressed good glycemic and hypertensive control  - Monitor yearly     Refraction recommended  Can call for tech eval as needed    Continue Over the counter readers for now  Artificial tears 3-4 times a day and as needed     Will refer to oculoplastics for evaluation of dermatochalasis BUL obstructing superior field of vision; may have a brow component too as patient with significant frontalis activation and lateral hooding    Counseled return precautions    Patient disposition:   Return for Follow Up next available oculoplastics; 1 year general, or sooner changes.    Attending Physician Attestation:  Complete documentation of historical and exam elements from today's encounter can be found in the full encounter summary report (not  reduplicated in this progress note).  I personally obtained the chief complaint(s) and history of present illness.  I confirmed and edited as necessary the review of systems, past medical/surgical history, family history, social history, and examination findings as documented by others; and I examined the patient myself.  I personally reviewed the relevant tests, images, and reports as documented above.  I formulated and edited as necessary the assessment and plan and discussed the findings and management plan with the patient and family. . - Rosie Coley MD

## 2022-12-02 NOTE — NURSING NOTE
Chief Complaints and History of Present Illnesses   Patient presents with     Diabetic Eye Exam     Mild nonproliferative diabetic retinopathy of both eyes without macular edema associated with diabetes mellitus due to underlying condition (H) +3 more  Dx     Chief Complaint(s) and History of Present Illness(es)     Diabetic Eye Exam            Comments: Mild nonproliferative diabetic retinopathy of both eyes without macular edema associated with diabetes mellitus due to underlying condition (H) +3 more  Dx          Comments    Pt states no change in VA since last visit  Pt states no flashes, floaters eye pain or redness  Pt states wife is concerned about the droopy eye lids  LBS: 117    Last A1C: 6.8  Lab Results       Component                Value               Date                       A1C                      9.2                 01/20/2019          Georgiana Reagan COT 9:52 AM December 2, 2022

## 2022-12-05 NOTE — TELEPHONE ENCOUNTER
FUTURE VISIT INFORMATION      FUTURE VISIT INFORMATION:    Date: 1/17/23    Time: 8:00am    [place] [facility] [place] INFORMATION:    Referring provider:  Rosie Coley MD    Referring providers clinic:  [facility]    Reason for visit/diagnosis  Dermatochalasis of both upper eyelids    RECORDS REQUESTED FROM:       Clinic name Comments Records Status Imaging Status   [facility] Ov/referral 12/2/22  Ov/notes 12/2/22-3/14/16 epic

## 2023-01-08 ENCOUNTER — HEALTH MAINTENANCE LETTER (OUTPATIENT)
Age: 63
End: 2023-01-08

## 2023-01-17 ENCOUNTER — OFFICE VISIT (OUTPATIENT)
Dept: OPHTHALMOLOGY | Facility: CLINIC | Age: 63
End: 2023-01-17
Payer: COMMERCIAL

## 2023-01-17 ENCOUNTER — PRE VISIT (OUTPATIENT)
Dept: OPHTHALMOLOGY | Facility: CLINIC | Age: 63
End: 2023-01-17

## 2023-01-17 DIAGNOSIS — E11.3293 MILD NONPROLIFERATIVE DIABETIC RETINOPATHY OF BOTH EYES WITHOUT MACULAR EDEMA ASSOCIATED WITH TYPE 2 DIABETES MELLITUS (H): ICD-10-CM

## 2023-01-17 DIAGNOSIS — H02.834 DERMATOCHALASIS OF BOTH UPPER EYELIDS: Primary | ICD-10-CM

## 2023-01-17 DIAGNOSIS — Z98.890 HX OF LASIK: ICD-10-CM

## 2023-01-17 DIAGNOSIS — H02.831 DERMATOCHALASIS OF BOTH UPPER EYELIDS: Primary | ICD-10-CM

## 2023-01-17 DIAGNOSIS — H02.403 ACQUIRED INVOLUTIONAL PTOSIS OF BOTH EYELIDS: ICD-10-CM

## 2023-01-17 PROCEDURE — 92285 EXTERNAL OCULAR PHOTOGRAPHY: CPT | Mod: GC | Performed by: OPHTHALMOLOGY

## 2023-01-17 PROCEDURE — 92081 LIMITED VISUAL FIELD XM: CPT | Mod: GC | Performed by: OPHTHALMOLOGY

## 2023-01-17 PROCEDURE — 99214 OFFICE O/P EST MOD 30 MIN: CPT | Mod: GC | Performed by: OPHTHALMOLOGY

## 2023-01-17 ASSESSMENT — CONF VISUAL FIELD
OS_SUPERIOR_NASAL_RESTRICTION: 0
OD_INFERIOR_NASAL_RESTRICTION: 0
OS_SUPERIOR_TEMPORAL_RESTRICTION: 0
OD_SUPERIOR_NASAL_RESTRICTION: 0
OD_INFERIOR_TEMPORAL_RESTRICTION: 0
OD_NORMAL: 1
OS_INFERIOR_NASAL_RESTRICTION: 0
OD_SUPERIOR_TEMPORAL_RESTRICTION: 0
METHOD: COUNTING FINGERS
OS_NORMAL: 1
OS_INFERIOR_TEMPORAL_RESTRICTION: 0

## 2023-01-17 ASSESSMENT — TONOMETRY
OD_IOP_MMHG: 10
OS_IOP_MMHG: 12
IOP_METHOD: ICARE

## 2023-01-17 ASSESSMENT — VISUAL ACUITY
METHOD: SNELLEN - LINEAR
OS_SC+: -1
OD_SC: 20/40
OS_SC: 20/25
OD_PH_SC: 20/25

## 2023-01-17 ASSESSMENT — SLIT LAMP EXAM - LIDS
COMMENTS: HEAVY DERMATOCHALASIS RESTING ON LASHES, TRUE PTOSIS
COMMENTS: HEAVY DERMATOCHALASIS RESTING ON LASHES, TRUE PTOSIS

## 2023-01-17 NOTE — PROGRESS NOTES
Chief Complaint(s) and History of Present Illness(es)     Droopy Eye Lid Evaluation            Laterality: right upper lid and left upper lid    Severity: severe    Associated signs and symptoms: Negative for lid swelling, trauma, eye   pain and redness    Pain scale: 0/10          Comments    Patient notes he has noticed drooping for about 3 years. Patient lifts   forehead all the time to see more clearly. Patient notices drooping in   photographs. Patient denies excess tearing or pain.  LES Yee January 17, 2023 9:17 AM        Patient states that for the past few years he has had to concentrate on keeping your eyes open. He notices it when he is driving, reading, watching TV, and golfing. He feels like he is always lifting his forehead. No headaches.          Assessment & Plan     Maurice Quiñones is a 63 year old male with the following diagnoses:   Encounter Diagnoses   Name Primary?     Dermatochalasis of both upper eyelids Yes     Hx of LASIK      Mild nonproliferative diabetic retinopathy of both eyes without macular edema associated with type 2 diabetes mellitus (H)      Acquired involutional ptosis of both eyelids          FUNCTIONAL COMPLAINTS RELATED TO DROOPY EYELIDS/BROWS:  Maurice Quiñones describes upper lids interfering with superior visual field and interfering with activities of daily living including reading, driving and watching television.     EXAM:     MRD1: Right eye 1   Left eye 1  Dermatochalasis with excess skin touching eyelashes     VISUAL FIELD:  Right eye untaped:0 degrees Right eye taped:43 degrees  Left eye untaped:6 degrees Left eye taped:46 degrees    Right eye visual field improves by: 43 degrees  Left eye visual field improves by: 40 degrees      PLAN:  Bilateral upper blepharoplasty (SONCF) 05178   Bilateral upper lids ptosis repair (levator) 43077    Patient disposition:   No follow-ups on file.     Riley Nicholson M.D.  PGY-2  Department of  Ophthalmology       Attending Physician Attestation: Complete documentation of historical and exam elements from today's encounter can be found in the full encounter summary report (not reduplicated in this progress note). I personally obtained the chief complaint(s) and history of present illness. I confirmed and edited as necessary the review of systems, past medical/surgical history, family history, social history, and examination findings as documented by others; and I examined the patient myself. I personally reviewed the relevant tests, images, and reports as documented above. I formulated and edited as necessary the assessment and plan and discussed the findings and management plan with the patient.  -Ruth Ann Cole MD    Today with Maurice Quiñones  and his wife , I reviewed the indications, risks, benefits, and alternatives of the proposed surgical procedure including, but not limited to, failure obtain the desired result  and need for additional surgery, bleeding, infection, loss of vision, loss of the eye, and the remote possibility of permanent damage to any organ system or death with the use of anesthesia.  I provided multiple opportunities for the questions, answered all questions to the best of my ability, and confirmed that my answers and my discussion were understood.   Ruth Ann Cole MD

## 2023-01-17 NOTE — LETTER
2023         RE:  :  MRN: Maurice Quiñones  1960  [id number]     Dear Dr. Coley,    Thank you for asking me to see your patient, Maurice Quiñones, for an oculoplastic   consultation.  My assessment and plan are below.  For further details, please see my attached clinic note.      Chief Complaint(s) and History of Present Illness(es)     Droopy Eye Lid Evaluation            Laterality: right upper lid and left upper lid    Severity: severe    Associated signs and symptoms: Negative for lid swelling, trauma, eye   pain and redness    Pain scale: 0/10          Comments    Patient notes he has noticed drooping for about 3 years. Patient lifts   forehead all the time to see more clearly. Patient notices drooping in   photographs. Patient denies excess tearing or pain.  LES Yee 2023 9:17 AM        Patient states that for the past few years he has had to concentrate on keeping your eyes open. He notices it when he is driving, reading, watching TV, and golfing. He feels like he is always lifting his forehead. No headaches.          Assessment & Plan     Maurice Quiñones is a 63 year old male with the following diagnoses:   Encounter Diagnoses   Name Primary?     Dermatochalasis of both upper eyelids Yes     Hx of LASIK      Mild nonproliferative diabetic retinopathy of both eyes without macular edema associated with type 2 diabetes mellitus (H)        FUNCTIONAL COMPLAINTS RELATED TO DROOPY EYELIDS/BROWS:  Maurice Quiñones describes upper lids interfering with superior visual field and interfering with activities of daily living including reading, driving and watching television.     EXAM:     MRD1: Right eye 1   Left eye 1  Dermatochalasis with excess skin touching eyelashes     VISUAL FIELD:  Right eye untaped:0 degrees Right eye taped:43 degrees  Left eye untaped:6 degrees Left eye taped:46 degrees    Right eye visual field improves by: 43 degrees  Left eye  visual field improves by: 40 degrees      PLAN:  Bilateral upper blepharoplasty (SONCF) 79665   Bilateral upper lids ptosis repair (levator) 28504    Patient disposition:   No follow-ups on file.     Riley Nicholson M.D.  PGY-2  Department of Ophthalmology        Again, thank you for allowing me to participate in the care of your patient.      Sincerely,    Ruth Ann Cole MD  Department of Ophthalmology and Visual Neurosciences  [facility]    CC: Rosie Coley MD  [address] [place] [id number]  Via In Basket

## 2023-01-17 NOTE — NURSING NOTE
Chief Complaints and History of Present Illnesses   Patient presents with     Droopy Eye Lid Evaluation     Chief Complaint(s) and History of Present Illness(es)     Droopy Eye Lid Evaluation            Laterality: right upper lid and left upper lid    Severity: severe    Associated signs and symptoms: Negative for lid swelling, trauma, eye pain and redness    Pain scale: 0/10          Comments    Patient notes he has noticed drooping for about 3 years. Patient lifts forehead all the time to see more clearly. Patient notices drooping in photographs. Patient denies excess tearing or pain.  Nafisa Ybarra, LES January 17, 2023 9:17 AM

## 2023-01-18 ENCOUNTER — TELEPHONE (OUTPATIENT)
Dept: OPHTHALMOLOGY | Facility: CLINIC | Age: 63
End: 2023-01-18
Payer: COMMERCIAL

## 2023-01-18 NOTE — TELEPHONE ENCOUNTER
Met with patient to schedule surgery with DR Aguayo    Surgery was scheduled on 2/17 at   Patient will have H&P at [facility]     Post-Op visit was scheduled on 3/7  Patient is aware a / is needed day of surgery.   Surgery packet was given, patient has my direct contact information for any further questions.    Discharged

## 2023-02-17 ENCOUNTER — HOSPITAL ENCOUNTER (OUTPATIENT)
Facility: CLINIC | Age: 63
Discharge: HOME | End: 2023-02-17
Attending: OPHTHALMOLOGY | Admitting: OPHTHALMOLOGY
Payer: COMMERCIAL

## 2023-02-17 ENCOUNTER — ANESTHESIA (OUTPATIENT)
Dept: SURGERY | Facility: CLINIC | Age: 63
End: 2023-02-17
Payer: COMMERCIAL

## 2023-02-17 ENCOUNTER — ANESTHESIA EVENT (OUTPATIENT)
Dept: SURGERY | Facility: CLINIC | Age: 63
End: 2023-02-17
Payer: COMMERCIAL

## 2023-02-17 VITALS
WEIGHT: 301 LBS | HEART RATE: 61 BPM | SYSTOLIC BLOOD PRESSURE: 180 MMHG | HEIGHT: 71 IN | TEMPERATURE: 97 F | BODY MASS INDEX: 42.14 KG/M2 | RESPIRATION RATE: 16 BRPM | DIASTOLIC BLOOD PRESSURE: 83 MMHG | OXYGEN SATURATION: 97 %

## 2023-02-17 DIAGNOSIS — H02.403 ACQUIRED INVOLUTIONAL PTOSIS OF BOTH EYELIDS: Primary | ICD-10-CM

## 2023-02-17 LAB
FASTING STATUS PATIENT QL REPORTED: YES
GLUCOSE BLDC GLUCOMTR-MCNC: 272 MG/DL (ref 70–99)
GLUCOSE SERPL-MCNC: 247 MG/DL (ref 70–99)
POTASSIUM SERPL-SCNC: 4.1 MMOL/L (ref 3.4–5.3)

## 2023-02-17 PROCEDURE — 250N000009 HC RX 250: Performed by: NURSE ANESTHETIST, CERTIFIED REGISTERED

## 2023-02-17 PROCEDURE — 710N000009 HC RECOVERY PHASE 1, LEVEL 1, PER MIN: Performed by: OPHTHALMOLOGY

## 2023-02-17 PROCEDURE — 370N000017 HC ANESTHESIA TECHNICAL FEE, PER MIN: Performed by: OPHTHALMOLOGY

## 2023-02-17 PROCEDURE — 258N000003 HC RX IP 258 OP 636: Performed by: ANESTHESIOLOGY

## 2023-02-17 PROCEDURE — 272N000001 HC OR GENERAL SUPPLY STERILE: Performed by: OPHTHALMOLOGY

## 2023-02-17 PROCEDURE — 36415 COLL VENOUS BLD VENIPUNCTURE: CPT | Performed by: ANESTHESIOLOGY

## 2023-02-17 PROCEDURE — 67904 REPAIR EYELID DEFECT: CPT | Mod: 50 | Performed by: OPHTHALMOLOGY

## 2023-02-17 PROCEDURE — 82947 ASSAY GLUCOSE BLOOD QUANT: CPT | Performed by: ANESTHESIOLOGY

## 2023-02-17 PROCEDURE — 360N000076 HC SURGERY LEVEL 3, PER MIN: Performed by: OPHTHALMOLOGY

## 2023-02-17 PROCEDURE — 82962 GLUCOSE BLOOD TEST: CPT

## 2023-02-17 PROCEDURE — 710N000012 HC RECOVERY PHASE 2, PER MINUTE: Performed by: OPHTHALMOLOGY

## 2023-02-17 PROCEDURE — 250N000009 HC RX 250: Performed by: OPHTHALMOLOGY

## 2023-02-17 PROCEDURE — 84132 ASSAY OF SERUM POTASSIUM: CPT | Performed by: ANESTHESIOLOGY

## 2023-02-17 PROCEDURE — 250N000011 HC RX IP 250 OP 636: Performed by: NURSE ANESTHETIST, CERTIFIED REGISTERED

## 2023-02-17 PROCEDURE — 999N000141 HC STATISTIC PRE-PROCEDURE NURSING ASSESSMENT: Performed by: OPHTHALMOLOGY

## 2023-02-17 RX ORDER — LIDOCAINE HYDROCHLORIDE 20 MG/ML
INJECTION, SOLUTION INFILTRATION; PERINEURAL PRN
Status: DISCONTINUED | OUTPATIENT
Start: 2023-02-17 | End: 2023-02-17

## 2023-02-17 RX ORDER — HYDROMORPHONE HCL IN WATER/PF 6 MG/30 ML
0.2 PATIENT CONTROLLED ANALGESIA SYRINGE INTRAVENOUS EVERY 5 MIN PRN
Status: DISCONTINUED | OUTPATIENT
Start: 2023-02-17 | End: 2023-02-17 | Stop reason: HOSPADM

## 2023-02-17 RX ORDER — TETRACAINE HYDROCHLORIDE 5 MG/ML
SOLUTION OPHTHALMIC
Status: DISCONTINUED
Start: 2023-02-17 | End: 2023-02-17 | Stop reason: HOSPADM

## 2023-02-17 RX ORDER — SODIUM CHLORIDE, SODIUM LACTATE, POTASSIUM CHLORIDE, CALCIUM CHLORIDE 600; 310; 30; 20 MG/100ML; MG/100ML; MG/100ML; MG/100ML
INJECTION, SOLUTION INTRAVENOUS CONTINUOUS
Status: DISCONTINUED | OUTPATIENT
Start: 2023-02-17 | End: 2023-02-17 | Stop reason: HOSPADM

## 2023-02-17 RX ORDER — ERYTHROMYCIN 5 MG/G
OINTMENT OPHTHALMIC PRN
Status: DISCONTINUED | OUTPATIENT
Start: 2023-02-17 | End: 2023-02-17 | Stop reason: HOSPADM

## 2023-02-17 RX ORDER — ERYTHROMYCIN 5 MG/G
OINTMENT OPHTHALMIC
Status: DISCONTINUED
Start: 2023-02-17 | End: 2023-02-17 | Stop reason: HOSPADM

## 2023-02-17 RX ORDER — LIDOCAINE HYDROCHLORIDE AND EPINEPHRINE 10; 10 MG/ML; UG/ML
INJECTION, SOLUTION INFILTRATION; PERINEURAL
Status: DISCONTINUED
Start: 2023-02-17 | End: 2023-02-17 | Stop reason: HOSPADM

## 2023-02-17 RX ORDER — OXYCODONE HYDROCHLORIDE 5 MG/1
10 TABLET ORAL EVERY 4 HOURS PRN
Status: DISCONTINUED | OUTPATIENT
Start: 2023-02-17 | End: 2023-02-17 | Stop reason: HOSPADM

## 2023-02-17 RX ORDER — FENTANYL CITRATE 0.05 MG/ML
25 INJECTION, SOLUTION INTRAMUSCULAR; INTRAVENOUS EVERY 5 MIN PRN
Status: DISCONTINUED | OUTPATIENT
Start: 2023-02-17 | End: 2023-02-17 | Stop reason: HOSPADM

## 2023-02-17 RX ORDER — ONDANSETRON 4 MG/1
4 TABLET, ORALLY DISINTEGRATING ORAL EVERY 30 MIN PRN
Status: DISCONTINUED | OUTPATIENT
Start: 2023-02-17 | End: 2023-02-17 | Stop reason: HOSPADM

## 2023-02-17 RX ORDER — ONDANSETRON 2 MG/ML
4 INJECTION INTRAMUSCULAR; INTRAVENOUS EVERY 30 MIN PRN
Status: DISCONTINUED | OUTPATIENT
Start: 2023-02-17 | End: 2023-02-17 | Stop reason: HOSPADM

## 2023-02-17 RX ORDER — ERYTHROMYCIN 5 MG/G
OINTMENT OPHTHALMIC
Qty: 3.5 G | Refills: 0 | Status: SHIPPED | OUTPATIENT
Start: 2023-02-17

## 2023-02-17 RX ORDER — FENTANYL CITRATE 0.05 MG/ML
50 INJECTION, SOLUTION INTRAMUSCULAR; INTRAVENOUS EVERY 5 MIN PRN
Status: DISCONTINUED | OUTPATIENT
Start: 2023-02-17 | End: 2023-02-17 | Stop reason: HOSPADM

## 2023-02-17 RX ORDER — OXYCODONE HYDROCHLORIDE 5 MG/1
5 TABLET ORAL EVERY 4 HOURS PRN
Status: DISCONTINUED | OUTPATIENT
Start: 2023-02-17 | End: 2023-02-17 | Stop reason: HOSPADM

## 2023-02-17 RX ORDER — HYDROMORPHONE HCL IN WATER/PF 6 MG/30 ML
0.4 PATIENT CONTROLLED ANALGESIA SYRINGE INTRAVENOUS EVERY 5 MIN PRN
Status: DISCONTINUED | OUTPATIENT
Start: 2023-02-17 | End: 2023-02-17 | Stop reason: HOSPADM

## 2023-02-17 RX ORDER — PROPOFOL 10 MG/ML
INJECTION, EMULSION INTRAVENOUS PRN
Status: DISCONTINUED | OUTPATIENT
Start: 2023-02-17 | End: 2023-02-17

## 2023-02-17 RX ORDER — ROSUVASTATIN CALCIUM 40 MG/1
40 TABLET, COATED ORAL DAILY
COMMUNITY

## 2023-02-17 RX ORDER — TETRACAINE HYDROCHLORIDE 5 MG/ML
SOLUTION OPHTHALMIC PRN
Status: DISCONTINUED | OUTPATIENT
Start: 2023-02-17 | End: 2023-02-17 | Stop reason: HOSPADM

## 2023-02-17 RX ORDER — BUPIVACAINE HYDROCHLORIDE AND EPINEPHRINE 5; 5 MG/ML; UG/ML
INJECTION, SOLUTION EPIDURAL; INTRACAUDAL; PERINEURAL
Status: DISCONTINUED
Start: 2023-02-17 | End: 2023-02-17 | Stop reason: HOSPADM

## 2023-02-17 RX ORDER — ONDANSETRON 2 MG/ML
INJECTION INTRAMUSCULAR; INTRAVENOUS PRN
Status: DISCONTINUED | OUTPATIENT
Start: 2023-02-17 | End: 2023-02-17

## 2023-02-17 RX ADMIN — ONDANSETRON 4 MG: 2 INJECTION INTRAMUSCULAR; INTRAVENOUS at 07:41

## 2023-02-17 RX ADMIN — PROPOFOL 50 MG: 10 INJECTION, EMULSION INTRAVENOUS at 07:39

## 2023-02-17 RX ADMIN — SODIUM CHLORIDE, POTASSIUM CHLORIDE, SODIUM LACTATE AND CALCIUM CHLORIDE: 600; 310; 30; 20 INJECTION, SOLUTION INTRAVENOUS at 06:23

## 2023-02-17 RX ADMIN — LIDOCAINE HYDROCHLORIDE 60 MG: 20 INJECTION, SOLUTION INFILTRATION; PERINEURAL at 07:39

## 2023-02-17 RX ADMIN — MIDAZOLAM 2 MG: 1 INJECTION INTRAMUSCULAR; INTRAVENOUS at 07:34

## 2023-02-17 RX ADMIN — PROPOFOL 20 MG: 10 INJECTION, EMULSION INTRAVENOUS at 07:41

## 2023-02-17 ASSESSMENT — ACTIVITIES OF DAILY LIVING (ADL)
ADLS_ACUITY_SCORE: 35
ADLS_ACUITY_SCORE: 35

## 2023-02-17 ASSESSMENT — COPD QUESTIONNAIRES: COPD: 0

## 2023-02-17 NOTE — DISCHARGE INSTRUCTIONS
**Because you had anesthesia today and your history of sleep apnea, it is extremely important that you use your CPAP machine for the next 24 hours while napping or sleeping.**     Post-operative Instructions  Ophthalmic Plastic and Reconstructive Surgery    Ruth Ann Cole M.D.     All instructions apply to the operated eye(s) or eyelid(s).    Wound care and personal care  ? Apply ice compresses 15 minutes of every hour while awake for 2 days. If you are sleeping, you don't need to wake up to ice. As long as there is no further bleeding, after two days, switch to warm water compresses for five minutes, four times a day until seen by your physician.   ? You may shower or wash your hair the day after surgery. Do not go swimming for at least 2 weeks to prevent contamination of your wounds.  ? You may go for walks and light activity is ok, but no heavy (over 15 pounds) lifting, bending or excessive straining for one week.   ? Do not apply make-up to the eyes or eyelids for 2 weeks after surgery.  ? Expect some swelling, bruising, black eye (even into the lower eyelids and cheeks). Also expect serum caking, crusting and discharge from the eye and/or incisions. A small amount of surface bleeding, and depending on the type of surgery, bleeding from the inside of the eyelid, is normal for the first 48 hours.  ? Avoid straining, bending at the waist, or lifting more than 15 pounds for 1 week. Sleeping with your head elevated, such as in a recliner, for the first several days can help swelling resolve more quickly.   ? Do continue to ambulate (walk) as you normally would - being sedentary after surgery can cause blood clots.   ? Your eye(s) and eyelid(s) may be painful and tender. This is normal after surgery.      Contact information and follow-up  ? Return to the Eye Clinic for a follow-up appointment with your physician as scheduled. If no appointment has been scheduled:   - [facility] eye clinic:  [phone] for an appointment with Dr. Cole within 2 to 3 weeks from your date of surgery.      -  [facility] eye clinic: [phone] for an appointment with Dr. Cole within 2 to 3 weeks from your date of surgery.     ? For severe pain, bleeding, or loss of vision, call the [facility] at [phone] or [facility] at [phone].     After hours or on weekends and holidays, call [phone] and ask to speak with the ophthalmologist on call.    An on call person can be reached after hours for concerns. The on call doctor should not call in medication refill requests after hours or on weekends, so please plan accordingly. An effort has been made to provide adequate pain medications following every surgery, and refills will not be provided in most instances.     Medications  ? Restart all regular home medications and eye drops. If you take Plavix or Aspirin on a regular basis, wait for 72 hours after your surgery before restarting these in order to decrease the risk of bleeding complications.  ? Avoid aspirin and aspirin-like medications (Motrin, Aleve, Ibuprofen, [place] etc) for 72 hours to reduce the risk of bleeding. You may take Tylenol (acetaminophen) for pain.  ? In addition to your home medications, take the following post-operative medications as prescribed by your physician.    ? Apply antibiotic ointment to all sutures three times a day, and into the operated eye(s) at night.   Once you run out, you can apply Vaseline or Aquaphor (over the counter) to the incisions. Don't put the Vaseline or Aquaphor into your eyes.   ? If you have ocular irritation, you can use over the counter artificial tears such as Refresh, Systane, or Blink. Do not use Visine, Clear Eyes, or any other drop that gets the red out.   ? In many cases, postoperative discomfort can be managed with Tylenol alone.     ? WARNING: Some pain medications contain  acetaminophen. You must not take more than 4,000 mg of acetaminophen per 24-hour period. This is equivalent to 12 tablets of Norco, Percocet or Tylenol #3. If you take other over-the-counter medications containing acetaminophen, you must take the amount of acetaminophen into account and reduce the number of prescribed pain pills accordingly.    Same Day Surgery Discharge Instructions for  Sedation and General Anesthesia     It's not unusual to feel dizzy, light-headed or faint for up to 24 hours after surgery or while taking pain medication.  If you have these symptoms: sit for a few minutes before standing and have someone assist you when you get up to walk or use the bathroom.    You should rest and relax for the next 24 hours. We recommend you make arrangements to have an adult stay with you for at least 24 hours after your discharge.  Avoid hazardous and strenuous activity.    DO NOT DRIVE any vehicle or operate mechanical equipment for 24 hours following the end of your surgery.  Even though you may feel normal, your reactions may be affected by the medication you have received.    Do not drink alcoholic beverages for 24 hours following surgery.     Slowly progress to your regular diet as you feel able. It's not unusual to feel nauseated and/or vomit after receiving anesthesia.  If you develop these symptoms, drink clear liquids (apple juice, ginger ale, broth, 7-up, etc. ) until you feel better.  If your nausea and vomiting persists for 24 hours, please notify your surgeon.      All narcotic pain medications, along with inactivity and anesthesia, can cause constipation. Drinking plenty of liquids and increasing fiber intake will help.    For any questions of a medical nature, call your surgeon.    Do not make important decisions for 24 hours.    If you had general anesthesia, you may have a sore throat for a couple of days related to the breathing tube used during surgery.  You may use Cepacol lozenges to help  with this discomfort.  If it worsens or if you develop a fever, contact your surgeon.     If you feel your pain is not well managed with the pain medications prescribed by your surgeon, please contact your surgeon's office to let them know so they can address your concerns.

## 2023-02-17 NOTE — ANESTHESIA PREPROCEDURE EVALUATION
Anesthesia Pre-Procedure Evaluation    Patient: Maurice Quiñones   MRN: [id number] : 1960        Procedure : Procedure(s):  Both upper eyelid blepharoplasty and ptosis repair          Past Medical History:   Diagnosis Date     Arthritis      Diabetes (H)      Obese      Sleep apnea      Thyroid disease       Past Surgical History:   Procedure Laterality Date     ARTHROSCOPIC RECONSTRUCTION ANTERIOR AND POSTERIOR CRUCIATE LIGAMENT, COMBINED Bilateral      Left  Knee and  Right Knee     HERNIORRHAPHY VENTRAL N/A 2019    Procedure: OPEN REPAIR OF INCARCERATED VENTRAL HERNIA;  Surgeon: Michael Fenton MD;  Location: [facility] OR     JOINT REPLACEMENT       LAPAROSCOPIC HERNIORRHAPHY VENTRAL N/A 2019    Procedure: LAPAROSCOPIC REPAIR OF INCARCERTED VENTRAL HERNIA WITH MESH;  Surgeon: Michael Fenton MD;  Location: [facility] OR     LASIK BILATERAL Bilateral 2011    Clinic was located in [place]      Allergies   Allergen Reactions     Aspirin Other (See Comments)     BLEEDS EASILY      Social History     Tobacco Use     Smoking status: Former     Smokeless tobacco: Current     Types: Chew   Substance Use Topics     Alcohol use: Not on file      Wt Readings from Last 1 Encounters:   23 136.5 kg (301 lb)        Anesthesia Evaluation            ROS/MED HX  ENT/Pulmonary:     (+) sleep apnea,  (-) asthma and COPD   Neurologic:     (+) peripheral neuropathy,     Cardiovascular:     (+) Dyslipidemia hypertension-----    METS/Exercise Tolerance:     Hematologic:       Musculoskeletal:   (+) arthritis,     GI/Hepatic:     (+) liver disease,     Renal/Genitourinary:       Endo:     (+) type II DM, thyroid problem, hypothyroidism, Obesity,     Psychiatric/Substance Use:       Infectious Disease:       Malignancy:       Other:            Physical Exam    Airway        Mallampati: III   TM distance: > 3 FB   Neck ROM: full     Respiratory Devices and Support         Dental      Comment: Missing teeth        Cardiovascular   cardiovascular exam normal          Pulmonary   pulmonary exam normal                OUTSIDE LABS:  CBC:   Lab Results   Component Value Date    WBC 9.4 01/22/2019    WBC 17.4 (H) 01/19/2019    HGB 11.3 (L) 01/22/2019    HGB 10.8 (L) 01/22/2019    HCT 32.9 (L) 01/22/2019    HCT 43.9 01/19/2019     01/23/2019     01/22/2019     BMP:   Lab Results   Component Value Date     01/22/2019     01/19/2019    POTASSIUM 4.1 02/17/2023    POTASSIUM 3.9 01/22/2019    CHLORIDE 108 01/22/2019    CHLORIDE 103 01/19/2019    CO2 26 01/22/2019    CO2 23 01/19/2019    BUN 12 01/22/2019    BUN 34 (H) 01/19/2019    CR 0.89 01/23/2019    CR 0.96 01/22/2019     (H) 02/17/2023     (H) 01/22/2019     COAGS:   Lab Results   Component Value Date    INR 1.00 01/19/2019     POC:   Lab Results   Component Value Date     (H) 01/23/2019     HEPATIC:   Lab Results   Component Value Date    ALBUMIN 2.6 (L) 01/22/2019    PROTTOTAL 8.5 01/19/2019    ALT 31 01/19/2019    AST 14 01/19/2019    ALKPHOS 53 01/19/2019    BILITOTAL 0.5 01/19/2019     OTHER:   Lab Results   Component Value Date    LACT 3.4 (H) 01/19/2019    A1C 9.2 (H) 01/20/2019    DELBERT 8.4 (L) 01/22/2019    PHOS 1.8 (L) 01/22/2019    LIPASE 50 (L) 01/19/2019       Anesthesia Plan    ASA Status:  3      Anesthesia Type: MAC.              Consents    Anesthesia Plan(s) and associated risks, benefits, and realistic alternatives discussed. Questions answered and patient/representative(s) expressed understanding.    - Discussed:     - Discussed with:  Patient         Postoperative Care       PONV prophylaxis: Ondansetron (or other 5HT-3)     Comments:                Holly Nicole

## 2023-02-17 NOTE — ANESTHESIA POSTPROCEDURE EVALUATION
Patient: Maurice Quiñones    Procedure: Procedure(s):  Both upper eyelid blepharoplasty and ptosis repair       Anesthesia Type:  MAC    Note:  Disposition: Outpatient   Postop Pain Control: Uneventful            Sign Out: Well controlled pain   PONV: No   Neuro/Psych: Uneventful            Sign Out: Acceptable/Baseline neuro status   Airway/Respiratory: Uneventful            Sign Out: Acceptable/Baseline resp. status   CV/Hemodynamics: Uneventful            Sign Out: Acceptable CV status   Other NRE:    DID A NON-ROUTINE EVENT OCCUR? No           Last vitals:  Vitals Value Taken Time   /76 02/17/23 0831   Temp 36.2  C (97.2  F) 02/17/23 0830   Pulse 50 02/17/23 0834   Resp 9 02/17/23 0834   SpO2 96 % 02/17/23 0833   Vitals shown include unvalidated device data.    Electronically Signed By: Holly Nicole  February 17, 2023  1:03 PM

## 2023-02-17 NOTE — BRIEF OP NOTE
[facility]    Brief Operative Note    Pre-operative diagnosis: Dermatochalasis of both upper eyelids [H02.831, H02.834]  Acquired involutional ptosis of both eyelids [H02.403]  Post-operative diagnosis Same as pre-operative diagnosis    Procedure: Procedure(s):  Both upper eyelid blepharoplasty and ptosis repair  Surgeon: Surgeon(s) and Role:     * Ruth Ann Cole MD - Primary   Kiara Guillermo MD - fellow assisting  Anesthesia: MAC with Local   Estimated Blood Loss: Minimal    Drains: None  Specimens: * No specimens in log *  Findings:   as expected.  Complications: None.  Implants: * No implants in log *

## 2023-02-17 NOTE — INTERVAL H&P NOTE
"I have reviewed the surgical (or preoperative) H&P that is linked to this encounter, and examined the patient. There are no significant changes    Clinical Conditions Present on Arrival:  Clinically Significant Risk Factors Present on Admission                    # Severe Obesity: Estimated body mass index is 41.98 kg/m  as calculated from the following:    Height as of this encounter: 1.803 m (5' 11\").    Weight as of this encounter: 136.5 kg (301 lb).       "

## 2023-02-17 NOTE — ANESTHESIA CARE TRANSFER NOTE
Patient: Maurice Quiñones    Procedure: Procedure(s):  Both upper eyelid blepharoplasty and ptosis repair       Diagnosis: Dermatochalasis of both upper eyelids [H02.831, H02.834]  Acquired involutional ptosis of both eyelids [H02.403]  Diagnosis Additional Information: No value filed.    Anesthesia Type:   MAC     Note:    Oropharynx: oropharynx clear of all foreign objects and spontaneously breathing  Level of Consciousness: awake  Oxygen Supplementation: room air    Independent Airway: airway patency satisfactory and stable  Dentition: dentition unchanged  Vital Signs Stable: post-procedure vital signs reviewed and stable  Report to RN Given: handoff report given  Patient transferred to: Phase II    Handoff Report: Identifed the Patient, Identified the Reponsible Provider, Reviewed the pertinent medical history, Discussed the surgical course, Reviewed Intra-OP anesthesia mangement and issues during anesthesia, Set expectations for post-procedure period and Allowed opportunity for questions and acknowledgement of understanding      Vitals:  Vitals Value Taken Time   BP     Temp     Pulse     Resp     SpO2 94 % 02/17/23 0807   Vitals shown include unvalidated device data.    Electronically Signed By: JAMAR Carroll CRNA  February 17, 2023  8:08 AM

## 2023-02-17 NOTE — OP NOTE
PREOPERATIVE DIAGNOSES:   1. Bilateral upper eyelid dermatochalasis.   2. Bilateral upper eyelid ptosis.     POSTOPERATIVE DIAGNOSES:   1. Bilateral upper eyelid dermatochalasis.   2. Bilateral upper eyelid ptosis.     PROCEDURE PERFORMED:   1. Bilateral upper eyelid blepharoplasty.   2. Bilateral upper eyelid ptosis repair by external levator resection.     SURGEON: Ruth Ann Cole MD    ASSISTANT: Kiara Guillermo MD    ANESTHESIA: Monitored with local infiltration, 50/50 mixture of 2% lidocaine with epinephrine and 0.5% Marcaine.     COMPLICATIONS: None.     ESTIMATED BLOOD LOSS: Less than 5 mL.     HISTORY: Maurice Quiñones  presented with upper lid drooping interfering with superior visual field and activities of daily living. After the risks, benefits and alternatives to the proposed procedure were explained, informed consent was obtained.     DESCRIPTION OF PROCEDURE: Maurice Quiñones  was brought to the operating room and placed supine on the operating table. IV sedation was given. The upper lid crease and excess upper eyelid skin was marked on each upper eyelid, and the eyelids infiltrated with local anesthetic. The area was prepped and draped in the typical sterile fashion for oculoplastic surgery. Attention was directed to the right side. Skin was incised following marked lines. Skin and orbicularis muscle flap were excised with cautery. Orbital septum was opened horizontally. The nasal and central fat pads were debulked with the high temperature cautery. Again, hemostasis was obtained. The orbital septum was opened horizontally and the levator aponeurosis identified. A rectangle was drawn with a marking pen, centered on the peak of the eyelid at the top of the tarsus. This was then excised with a Sheree scissors. Hemostasis was obtained. The levator aponeurosis was then reapproximated using 6-0 Vicryl sutures. The  upper eyelid was closed with running 6-0 plain gut suture.  Attention was  directed to the left side where the same procedure was performed. Throughout the procedure the eyelid height and contour was checked and adjusted to ensure appropriate levator advancement.  Antibiotic ointment was applied. The patient tolerated the procedure well and was taken to the recovery room in stable condition.    Ruth Ann Cole MD

## 2023-02-24 ENCOUNTER — TELEPHONE (OUTPATIENT)
Dept: OPHTHALMOLOGY | Facility: CLINIC | Age: 63
End: 2023-02-24
Payer: COMMERCIAL

## 2023-02-24 NOTE — TELEPHONE ENCOUNTER
Spoke with patient regarding scheduling for an earlier appointment same day as there was a scheduling over booking. Patient was able to reschedule as offered and sent a new reminder letter to confirmed address-Per Patient

## 2023-03-07 ENCOUNTER — OFFICE VISIT (OUTPATIENT)
Dept: OPHTHALMOLOGY | Facility: CLINIC | Age: 63
End: 2023-03-07
Payer: COMMERCIAL

## 2023-03-07 DIAGNOSIS — Z98.890 STATUS POST EYE SURGERY: Primary | ICD-10-CM

## 2023-03-07 DIAGNOSIS — H02.403 ACQUIRED INVOLUTIONAL PTOSIS OF BOTH EYELIDS: ICD-10-CM

## 2023-03-07 DIAGNOSIS — H02.831 DERMATOCHALASIS OF BOTH UPPER EYELIDS: ICD-10-CM

## 2023-03-07 DIAGNOSIS — H02.834 DERMATOCHALASIS OF BOTH UPPER EYELIDS: ICD-10-CM

## 2023-03-07 PROCEDURE — 99024 POSTOP FOLLOW-UP VISIT: CPT | Mod: GC | Performed by: OPHTHALMOLOGY

## 2023-03-07 ASSESSMENT — LAGOPHTHALMOS
OS_LAGOPHTHALMOS: 0
OD_LAGOPHTHALMOS: 0

## 2023-03-07 ASSESSMENT — VISUAL ACUITY
OS_PH_SC+: -3
OD_SC: 20/40
METHOD: SNELLEN - LINEAR
OD_PH_SC: 20/30
OD_PH_SC+: -2
OS_SC+: -1
OD_SC+: -2
OS_SC: 20/30
OS_PH_SC: 20/25

## 2023-03-07 ASSESSMENT — MARGIN REFLEX DISTANCE
OS_MRD1: 3.5
OD_MRD1: 2.5

## 2023-03-07 ASSESSMENT — TONOMETRY
IOP_METHOD: ICARE
OS_IOP_MMHG: 15
OD_IOP_MMHG: 16

## 2023-03-07 ASSESSMENT — EXTERNAL EXAM - RIGHT EYE: OD_EXAM: NORMAL

## 2023-03-07 ASSESSMENT — EXTERNAL EXAM - LEFT EYE: OS_EXAM: NORMAL

## 2023-03-07 NOTE — PROGRESS NOTES
"     Chief Complaint(s) and History of Present Illness(es)     Follow Up            Laterality: both eyes    Associated symptoms: dryness.  Negative for eye pain, tearing and   discharge    Treatments tried: ointment    Pain scale: 0/10    Comments: S/P Both upper eyelid blepharoplasty and ptosis repair 2/17/23          Comments    Feeling fine after surgery. No pain or discomfort. Seeing more after   surgery.More light sensitive each eye.   Some dryness each eye. Still feeling bumps on incision lines but   improving. Happy with the result of of surgery.     EES ointment TID to incision lines each eye.    Josefina Stevenson, COT COT 11:04 AM March 7, 2023         Assessment & Plan     Maurice Quiñones is a 63 year old male with the following diagnoses:     ICD-10-CM    1. Status post eye surgery  Z98.890       2. Dermatochalasis of both upper eyelids  H02.831     H02.834       3. Acquired involutional ptosis of both eyelids  H02.403         POW2 S/P Both upper eyelid blepharoplasty and ptosis repair 2/17/23     Happy with outcome. Doing well.     Patient Instructions   - Apply warm compresses for 1 minute three times a day until your bruising has resolved.  - Cool compresses can help with swelling and itching, you can alternate cool compresses with warm compresses if you find it helpful.  - Apply Aquaphor or Vaseline to the incision at bedtime.   - If you have symptoms of eye irritation, it is good to use over the counter artificial tears. Good brands include Refresh, Blink, and Systane. Do NOT get drops that are for \"red eyes.\"   - It is normal for the incision to appear raised, red, itch and have small lumps. You can gently massage any small bumps along the incision line. These can take up to six months to resolve.        Return to clinic in 2 months.    Patient disposition:   Return in about 2 months (around 5/7/2023) for Follow Up.          Lisseth Timmons MD  PGY-4 Resident Physician  Department of " Ophthalmology    Attending Physician Attestation: Complete documentation of historical and exam elements from today's encounter can be found in the full encounter summary report (not reduplicated in this progress note). I personally obtained the chief complaint(s) and history of present illness. I confirmed and edited as necessary the review of systems, past medical/surgical history, family history, social history, and examination findings as documented by others; and I examined the patient myself. I personally reviewed the relevant tests, images, and reports as documented above. I formulated and edited as necessary the assessment and plan and discussed the findings and management plan with the patient.  -Ruth Ann Cole MD

## 2023-03-07 NOTE — NURSING NOTE
Chief Complaints and History of Present Illnesses   Patient presents with     Follow Up     S/P Both upper eyelid blepharoplasty and ptosis repair 2/17/23     Chief Complaint(s) and History of Present Illness(es)     Follow Up            Laterality: both eyes    Associated symptoms: dryness.  Negative for eye pain, tearing and discharge    Treatments tried: ointment    Pain scale: 0/10    Comments: S/P Both upper eyelid blepharoplasty and ptosis repair 2/17/23          Comments    Feeling fine after surgery. No pain or discomfort. Seeing more after surgery.More light sensitive each eye.   Some dryness each eye. Still feeling bumps on incision lines but improving. Happy with the result of of surgery.     EES ointment TID to incision lines each eye.    Josefina Stevenson, COT COT 11:04 AM March 7, 2023

## 2023-06-02 ENCOUNTER — HEALTH MAINTENANCE LETTER (OUTPATIENT)
Age: 63
End: 2023-06-02

## 2023-07-16 ENCOUNTER — HEALTH MAINTENANCE LETTER (OUTPATIENT)
Age: 63
End: 2023-07-16

## 2023-12-04 ENCOUNTER — OFFICE VISIT (OUTPATIENT)
Dept: OPHTHALMOLOGY | Facility: CLINIC | Age: 63
End: 2023-12-04
Attending: STUDENT IN AN ORGANIZED HEALTH CARE EDUCATION/TRAINING PROGRAM
Payer: COMMERCIAL

## 2023-12-04 DIAGNOSIS — H04.123 DRY EYES, BILATERAL: ICD-10-CM

## 2023-12-04 DIAGNOSIS — E11.3293 MILD NONPROLIFERATIVE DIABETIC RETINOPATHY OF BOTH EYES WITHOUT MACULAR EDEMA ASSOCIATED WITH TYPE 2 DIABETES MELLITUS (H): Primary | ICD-10-CM

## 2023-12-04 DIAGNOSIS — H52.203 MYOPIA OF BOTH EYES WITH ASTIGMATISM AND PRESBYOPIA: ICD-10-CM

## 2023-12-04 DIAGNOSIS — H52.4 MYOPIA OF BOTH EYES WITH ASTIGMATISM AND PRESBYOPIA: ICD-10-CM

## 2023-12-04 DIAGNOSIS — H25.13 NUCLEAR SCLEROTIC CATARACT OF BOTH EYES: ICD-10-CM

## 2023-12-04 DIAGNOSIS — H52.13 MYOPIA OF BOTH EYES WITH ASTIGMATISM AND PRESBYOPIA: ICD-10-CM

## 2023-12-04 DIAGNOSIS — Z98.890 HX OF LASIK: ICD-10-CM

## 2023-12-04 PROCEDURE — 99211 OFF/OP EST MAY X REQ PHY/QHP: CPT | Performed by: STUDENT IN AN ORGANIZED HEALTH CARE EDUCATION/TRAINING PROGRAM

## 2023-12-04 PROCEDURE — 92014 COMPRE OPH EXAM EST PT 1/>: CPT | Performed by: STUDENT IN AN ORGANIZED HEALTH CARE EDUCATION/TRAINING PROGRAM

## 2023-12-04 ASSESSMENT — CONF VISUAL FIELD
OD_SUPERIOR_TEMPORAL_RESTRICTION: 0
OS_NORMAL: 1
OS_INFERIOR_TEMPORAL_RESTRICTION: 0
OD_INFERIOR_NASAL_RESTRICTION: 0
OS_SUPERIOR_TEMPORAL_RESTRICTION: 0
OD_SUPERIOR_NASAL_RESTRICTION: 0
OS_INFERIOR_NASAL_RESTRICTION: 0
METHOD: COUNTING FINGERS
OD_INFERIOR_TEMPORAL_RESTRICTION: 0
OS_SUPERIOR_NASAL_RESTRICTION: 0
OD_NORMAL: 1

## 2023-12-04 ASSESSMENT — REFRACTION_MANIFEST
OS_SPHERE: -0.75
OS_AXIS: 038
OD_ADD: +2.50
OD_AXIS: 160
OS_CYLINDER: +0.50
OS_ADD: +2.50
OD_CYLINDER: +1.75
OD_SPHERE: -0.50

## 2023-12-04 ASSESSMENT — VISUAL ACUITY
OD_SC: 20/50
OD_PH_SC: 20/40
OS_SC: 20/40
METHOD: SNELLEN - LINEAR
OS_SC+: -2
OS_PH_SC+: -1
OD_PH_SC+: +2
OD_SC+: -2+1
OS_PH_SC: 20/30

## 2023-12-04 ASSESSMENT — CUP TO DISC RATIO
OS_RATIO: 0.35
OD_RATIO: 0.4

## 2023-12-04 ASSESSMENT — TONOMETRY
OD_IOP_MMHG: 17
IOP_METHOD: TONOPEN
OS_IOP_MMHG: 17

## 2023-12-04 ASSESSMENT — EXTERNAL EXAM - RIGHT EYE: OD_EXAM: NORMAL

## 2023-12-04 ASSESSMENT — SLIT LAMP EXAM - LIDS
COMMENTS: NORMAL
COMMENTS: NORMAL

## 2023-12-04 ASSESSMENT — EXTERNAL EXAM - LEFT EYE: OS_EXAM: NORMAL

## 2023-12-04 NOTE — PROGRESS NOTES
HPI       Follow Up    Associated symptoms include Negative for dryness, eye pain, redness, flashes, floaters and itching.  Pain was noted as 0/10. Additional comments: 1 year follow up Mild nonproliferative diabetic retinopathy of both eyes              Comments    Pt states he's been noticing more blurriness in his distance vision lately.   No flashes or floaters. No eye pain, irritation or redness.   Uses Systane gel PRN about once a week at night.     DM2  LBS : 295 at 9:30am  Lab Results       Component                Value               Date                       A1C                      9.2                 01/20/2019               Alec Glez 9:40 AM December 4, 2023            Last edited by Alec Glez on 12/4/2023  9:40 AM.        Ocular Meds: ATs prn OU    POHx: S/P Both upper eyelid blepharoplasty and ptosis repair 2/17/23 (Dr Cole)    FOHx: no pertinent family ocular history    PMHx:   Past Medical History:   Diagnosis Date    Arthritis     Diabetes (H) 1996    Obese     Sleep apnea     Thyroid disease           Review of systems for the eyes was negative other than the pertinent positives/negatives listed in the HPI.      Assessment & Plan      Maurice Quiñones is a 60 year old male with the following diagnoses:     T2DM without Mild Nonproliferative Diabetic Retinopathy without Macular Edema OU  - strict BP/BG control  - patient understands importance  of good blood glucose control in order to reduce the risk of developing diabetic retinopathy  - yearly DFE    Nuclear sclerotic cataract OU  - approaching visual significant, though patient not bothered  - observe    Dry Eyes OU  - ATs prn OU    Hx of LASIK OU  - observe    Corneal scar OD  - no signs of infection  - observe    Myopia of both eyes with astigmatism and presbyopia  - refraction reviewed, though patient defers today    Counseled return precautions  Letter to PCP    Patient disposition:   Return in about 1 year (around 12/4/2024) for  Annual Visit, VTD, MRx, BAT, OCT Macula, or sooner changes.    Attending Physician Attestation:  Complete documentation of historical and exam elements from today's encounter can be found in the full encounter summary report (not reduplicated in this progress note).  I personally obtained the chief complaint(s) and history of present illness.  I confirmed and edited as necessary the review of systems, past medical/surgical history, family history, social history, and examination findings as documented by others; and I examined the patient myself.  I personally reviewed the relevant tests, images, and reports as documented above.  I formulated and edited as necessary the assessment and plan and discussed the findings and management plan with the patient and family. . - Rosie Coley MD

## 2023-12-04 NOTE — LETTER
12/4/2023       RE: Maurice Quiñones  [id number] [address] [id number]     Dear Colleague,    Thank you for referring your patient, Maurice Quiñones, to the [facility] [place] at [facility]. Please see a copy of my visit note below.    HPI       Follow Up    Associated symptoms include Negative for dryness, eye pain, redness, flashes, floaters and itching.  Pain was noted as 0/10. Additional comments: 1 year follow up Mild nonproliferative diabetic retinopathy of both eyes              Comments    Pt states he's been noticing more blurriness in his distance vision lately.   No flashes or floaters. No eye pain, irritation or redness.   Uses Systane gel PRN about once a week at night.     DM2  LBS : 295 at 9:30am  Lab Results       Component                Value               Date                       A1C                      9.2                 01/20/2019               Alec Glez 9:40 AM December 4, 2023            Last edited by Alec Glez on 12/4/2023  9:40 AM.        Ocular Meds: ATs prn OU    POHx: S/P Both upper eyelid blepharoplasty and ptosis repair 2/17/23 (Dr Cole)    FOHx: no pertinent family ocular history    PMHx:   Past Medical History:   Diagnosis Date     Arthritis      Diabetes (H) 1996     Obese      Sleep apnea      Thyroid disease           Review of systems for the eyes was negative other than the pertinent positives/negatives listed in the HPI.      Assessment & Plan      Maurice Quiñones is a 60 year old male with the following diagnoses:     T2DM without Mild Nonproliferative Diabetic Retinopathy without Macular Edema OU  - strict BP/BG control  - patient understands importance  of good blood glucose control in order to reduce the risk of developing diabetic retinopathy  - yearly DFE    Nuclear sclerotic cataract OU  - approaching visual significant, though patient not bothered  - observe    Dry Eyes  OU  - ATs prn OU    Hx of LASIK OU  - observe    Corneal scar OD  - no signs of infection  - observe    Myopia of both eyes with astigmatism and presbyopia  - refraction reviewed, though patient defers today    Counseled return precautions  Letter to PCP    Patient disposition:   Return in about 1 year (around 12/4/2024) for Annual Visit, VTD, MRx, BAT, OCT Macula, or sooner changes.    Attending Physician Attestation:  Complete documentation of historical and exam elements from today's encounter can be found in the full encounter summary report (not reduplicated in this progress note).  I personally obtained the chief complaint(s) and history of present illness.  I confirmed and edited as necessary the review of systems, past medical/surgical history, family history, social history, and examination findings as documented by others; and I examined the patient myself.  I personally reviewed the relevant tests, images, and reports as documented above.  I formulated and edited as necessary the assessment and plan and discussed the findings and management plan with the patient and family. . - Rosie Coley MD        Again, thank you for allowing me to participate in the care of your patient.      Sincerely,    Rosie Coley MD

## 2023-12-04 NOTE — NURSING NOTE
Chief Complaints and History of Present Illnesses   Patient presents with    Follow Up     1 year follow up Mild nonproliferative diabetic retinopathy of both eyes      Chief Complaint(s) and History of Present Illness(es)       Follow Up              Associated symptoms: Negative for dryness, eye pain, redness, flashes, floaters and itching    Pain scale: 0/10    Comments: 1 year follow up Mild nonproliferative diabetic retinopathy of both eyes               Comments    Pt states he's been noticing more blurriness in his distance vision lately.   No flashes or floaters. No eye pain, irritation or redness.   Uses Systane gel PRN about once a week at night.     DM2  LBS : 295 at 9:30am  Lab Results       Component                Value               Date                       A1C                      9.2                 01/20/2019               Alec Ho 9:40 AM December 4, 2023

## 2024-02-11 ENCOUNTER — HEALTH MAINTENANCE LETTER (OUTPATIENT)
Age: 64
End: 2024-02-11

## 2024-06-30 ENCOUNTER — HEALTH MAINTENANCE LETTER (OUTPATIENT)
Age: 64
End: 2024-06-30

## 2024-07-11 ENCOUNTER — TELEPHONE (OUTPATIENT)
Dept: OPHTHALMOLOGY | Facility: CLINIC | Age: 64
End: 2024-07-11
Payer: COMMERCIAL

## 2024-09-08 ENCOUNTER — HEALTH MAINTENANCE LETTER (OUTPATIENT)
Age: 64
End: 2024-09-08

## 2024-12-10 DIAGNOSIS — E11.3293 MILD NONPROLIFERATIVE DIABETIC RETINOPATHY OF BOTH EYES WITHOUT MACULAR EDEMA ASSOCIATED WITH TYPE 2 DIABETES MELLITUS (H): Primary | ICD-10-CM

## 2024-12-11 ENCOUNTER — OFFICE VISIT (OUTPATIENT)
Dept: OPHTHALMOLOGY | Facility: CLINIC | Age: 64
End: 2024-12-11
Attending: OPHTHALMOLOGY
Payer: COMMERCIAL

## 2024-12-11 DIAGNOSIS — H52.203 MYOPIA OF BOTH EYES WITH ASTIGMATISM AND PRESBYOPIA: ICD-10-CM

## 2024-12-11 DIAGNOSIS — H04.123 DRY EYES, BILATERAL: ICD-10-CM

## 2024-12-11 DIAGNOSIS — H52.13 MYOPIA OF BOTH EYES WITH ASTIGMATISM AND PRESBYOPIA: ICD-10-CM

## 2024-12-11 DIAGNOSIS — H52.4 MYOPIA OF BOTH EYES WITH ASTIGMATISM AND PRESBYOPIA: ICD-10-CM

## 2024-12-11 DIAGNOSIS — E11.3293 MILD NONPROLIFERATIVE DIABETIC RETINOPATHY OF BOTH EYES WITHOUT MACULAR EDEMA ASSOCIATED WITH TYPE 2 DIABETES MELLITUS (H): ICD-10-CM

## 2024-12-11 DIAGNOSIS — H02.88B MEIBOMIAN GLAND DYSFUNCTION (MGD) OF UPPER AND LOWER LIDS OF BOTH EYES: ICD-10-CM

## 2024-12-11 DIAGNOSIS — Z98.890 HX OF LASIK: Primary | ICD-10-CM

## 2024-12-11 DIAGNOSIS — H02.88A MEIBOMIAN GLAND DYSFUNCTION (MGD) OF UPPER AND LOWER LIDS OF BOTH EYES: ICD-10-CM

## 2024-12-11 DIAGNOSIS — H25.13 NUCLEAR SCLEROTIC CATARACT OF BOTH EYES: ICD-10-CM

## 2024-12-11 PROCEDURE — 92134 CPTRZ OPH DX IMG PST SGM RTA: CPT | Performed by: OPHTHALMOLOGY

## 2024-12-11 PROCEDURE — 99212 OFFICE O/P EST SF 10 MIN: CPT | Performed by: OPHTHALMOLOGY

## 2024-12-11 PROCEDURE — 92015 DETERMINE REFRACTIVE STATE: CPT

## 2024-12-11 ASSESSMENT — CONF VISUAL FIELD
OS_SUPERIOR_NASAL_RESTRICTION: 0
OS_INFERIOR_TEMPORAL_RESTRICTION: 0
OS_SUPERIOR_TEMPORAL_RESTRICTION: 0
OD_SUPERIOR_TEMPORAL_RESTRICTION: 0
METHOD: COUNTING FINGERS
OD_INFERIOR_NASAL_RESTRICTION: 0
OD_SUPERIOR_NASAL_RESTRICTION: 0
OS_NORMAL: 1
OD_INFERIOR_TEMPORAL_RESTRICTION: 0
OS_INFERIOR_NASAL_RESTRICTION: 0
OD_NORMAL: 1

## 2024-12-11 ASSESSMENT — EXTERNAL EXAM - RIGHT EYE: OD_EXAM: 2+ BROW PTOSIS

## 2024-12-11 ASSESSMENT — REFRACTION_MANIFEST
OS_CYLINDER: +1.50
OD_AXIS: 155
OS_ADD: +2.50
OD_ADD: +2.50
OD_SPHERE: -1.75
OD_AXIS: 155
OS_SPHERE: -1.00
OS_AXIS: 170
OD_SPHERE: -1.25
OS_AXIS: 180
OS_SPHERE: -0.75
OD_CYLINDER: +1.50
OS_CYLINDER: +1.25
OD_CYLINDER: +1.75

## 2024-12-11 ASSESSMENT — TONOMETRY
OD_IOP_MMHG: 14
IOP_METHOD: TONOPEN
OS_IOP_MMHG: 15

## 2024-12-11 ASSESSMENT — VISUAL ACUITY
METHOD: SNELLEN - LINEAR
OS_SC: 20/40
OS_BAT_HIGH: <20/400
OD_SC: 20/40
OD_BAT_HIGH: 20/150
METHOD_MR_RETINOSCOPY: 1
OS_SC+: -1

## 2024-12-11 ASSESSMENT — CUP TO DISC RATIO
OD_RATIO: 0.4
OS_RATIO: 0.35

## 2024-12-11 ASSESSMENT — SLIT LAMP EXAM - LIDS
COMMENTS: 2+ MEIBOMIAN GLAND DYSFUNCTION, 2+ DERMATOCHALASIS
COMMENTS: 2+ MEIBOMIAN GLAND DYSFUNCTION, 2+ DERMATOCHALASIS

## 2024-12-11 ASSESSMENT — EXTERNAL EXAM - LEFT EYE: OS_EXAM: 2+ BROW PTOSIS

## 2024-12-11 NOTE — PROGRESS NOTES
HPI       Eye Exam For Diabetes    In both eyes.  Since onset it is gradually worsening.  Associated symptoms include Negative for eye pain, flashes and floaters.  Treatments tried include no treatments.             Comments    Maurice Quiñones is a(n) 64 year old male who presents for a diabetic exam. Last eye exam was 1 year(s) ago. Since exam, vision is about the same. No lubricating drops. No flashes and floaters. No eye pain.     Lab Results       Component                Value               Date                       A1C                      9.2                 01/20/2019              Toi Keller COT 8:31 AM December 11, 2024               Last edited by Toi Keller on 12/11/2024  8:31 AM.         Review of systems for the eyes was negative other than the pertinent positives/negatives listed in the HPI.      Assessment & Plan    HPI:  Maurice Quiñones is a 64 year old male with history of hypothyroid, T2DM, HLD, HTN, laser in situ keratomileusis (LASIK) presents for complete exam. Notes some decreased vision both eyes. Not using any drops presently.    POHx: lasik  PMHx: hypothyroid, T2DM, HLD, HTN  Current Medications:   Current Outpatient Medications   Medication Sig Dispense Refill    Cholecalciferol 4000 units CAPS Take 4,000 Units by mouth daily      erythromycin (ROMYCIN) 5 MG/GM ophthalmic ointment Apply small amount to incision sites three times daily, then apply to inner lower lid of operative eye(s) at bedtime, as directed. 3.5 g 0    fenofibrate 160 MG tablet Take 1 tablet by mouth daily      gabapentin (NEURONTIN) 300 MG capsule Take 700 mg by mouth every evening Patient takes 2 x 300mg + 100mg capsule for a total of 700mg at bedtime.      gabapentin (NEURONTIN) 300 MG capsule Take 300 mg by mouth every morning       Ibuprofen-diphenhydrAMINE Cit 200-38 MG TABS Take 3 tablets by mouth At Bedtime      insulin reg HIGH CONC (HUMULIN R U-500 KWIKPEN) 500 UNIT/ML PEN soln Inject 70 Units  Subcutaneous 2 times daily (before meals)      levothyroxine (SYNTHROID/LEVOTHROID) 75 MCG tablet Take 75 mcg by mouth daily      lisinopril (PRINIVIL,ZESTRIL) 5 MG tablet Take 5 mg by mouth daily      metFORMIN (GLUCOPHAGE) 1000 MG tablet Take 1,000 mg by mouth daily (with breakfast)      metFORMIN (GLUCOPHAGE) 500 MG tablet Take 500 mg by mouth daily (with dinner)      pioglitazone (ACTOS) 30 MG tablet Take 30 mg by mouth daily      PSYLLIUM HUSK PO Take 4 tablets by mouth daily       rosuvastatin (CRESTOR) 40 MG tablet Take 40 mg by mouth daily      Vitamin Mixture (ANIYAH-C PO) Take 1 tablet by mouth daily       No current facility-administered medications for this visit.     FHx:  PSHx: myopic LASIK      Current Eye Medications:      Assessment & Plan:  (Z98.890) Hx of LASIK  (primary encounter diagnosis)  Likely experiencing some spherical aberrations that are contributing to decreased vision    (E11.3293) Mild nonproliferative diabetic retinopathy of both eyes without macular edema associated with type 2 diabetes mellitus (H)  No edema  Advised tighter BG/BP control     (H52.13,  H52.203,  H52.4) Myopia of both eyes with astigmatism and presbyopia  (H25.13) Nuclear sclerotic cataract of both eyes  Mild cataracts are present and may account for some of the patient's visual complaints. Advised spectacle correction for the time being. The patient will monitor for vision changes and contact us with any decrease in vision. Recheck next visit     May consider [facility]WIN vs surgery in [place] at [facility]    (H04.123) Dry eyes, bilateral  (H02.88A,  H02.88B) Meibomian gland dysfunction (MGD) of upper and lower lids of both eyes  Start artificial tears four times daily (best used before reading, using a computer or watching TV)  Start warm compresses for five minute twice daily (warm wash cloth or a microwaved, uncooked bag of rice in a clean sock)   Return in about 1 year (around 12/11/2025) for argos, pentacam, Annual  Visit-v/t/d/MRx.        Charanjit Wilkinson MD     Attending Physician Attestation:  Complete documentation of historical and exam elements from today's encounter can be found in the full encounter summary report (not reduplicated in this progress note).  I personally obtained the chief complaint(s) and history of present illness.  I confirmed and edited as necessary the review of systems, past medical/surgical history, family history, social history, and examination findings as documented by others; and I examined the patient myself.  I personally reviewed the relevant tests, images, and reports as documented above.  I formulated and edited as necessary the assessment and plan and discussed the findings and management plan with the patient and family. - Charanjit Wilkinson MD

## 2025-03-08 ENCOUNTER — HEALTH MAINTENANCE LETTER (OUTPATIENT)
Age: 65
End: 2025-03-08

## 2025-03-30 ENCOUNTER — HEALTH MAINTENANCE LETTER (OUTPATIENT)
Age: 65
End: 2025-03-30

## (undated) DEVICE — DEVICE TACKER ENDO RELIATACK ARTIC HANDLE RELTACK4XDPT

## (undated) DEVICE — SU VICRYL 2-0 TIE 12X18" J905T

## (undated) DEVICE — DRAPE BREAST/CHEST 29420

## (undated) DEVICE — SU ETHILON 3-0 FS-1 18" 663G

## (undated) DEVICE — SOL NACL 0.9% IRRIG 1000ML BOTTLE 07138-09

## (undated) DEVICE — GLOVE PROTEXIS W/NEU-THERA 8.0  2D73TE80

## (undated) DEVICE — PACK MINOR EYE CUSTOM ASC

## (undated) DEVICE — SU VICRYL 2-0 SH 27" J317H

## (undated) DEVICE — DRSG ABDOMINAL 07 1/2X8" 7197D

## (undated) DEVICE — ENDO TROCAR FIRST ENTRY KII FIOS Z-THRD 05X100MM CTF03

## (undated) DEVICE — SU VICRYL 1 CT 36" J959H

## (undated) DEVICE — ESU HOLDER LAP INST DISP PURPLE LONG 330MM H-PRO-330

## (undated) DEVICE — SU VICRYL 4-0 PS-2 18" UND J496H

## (undated) DEVICE — DRAIN JACKSON PRATT 15FR ROUND SU130-1323

## (undated) DEVICE — BLADE KNIFE SURG 10 371110

## (undated) DEVICE — Device

## (undated) DEVICE — ESU EYE HIGH TEMP 65410-183

## (undated) DEVICE — DRSG GAUZE 4X4" 3033

## (undated) DEVICE — PACK LAP CHOLE SLC15LCFSD

## (undated) DEVICE — ESU PENCIL W/COATED BLADE E2450H

## (undated) DEVICE — STPL SKIN PROXIMATE 35 WIDE PMW35

## (undated) DEVICE — NDL INSUFFLATION 13GA 120MM C2201

## (undated) DEVICE — DEVICE TACKER ENDO RELIATACK ART RELOAD 8 DEEP RELTACK8RDPT

## (undated) DEVICE — GLOVE PROTEXIS MICRO 7.5  2D73PM75

## (undated) DEVICE — PREP CHLORAPREP 26ML TINTED ORANGE  260815

## (undated) DEVICE — ESU PENCIL W/SMOKE EVAC NEPTUNE STRYKER 0703-046-000

## (undated) DEVICE — SOL WATER IRRIG 500ML BOTTLE 2F7113

## (undated) DEVICE — DRAIN JACKSON PRATT RESERVOIR 100ML SU130-1305

## (undated) DEVICE — ESU GROUND PAD UNIVERSAL W/O CORD

## (undated) DEVICE — LINEN TOWEL PACK X5 5464

## (undated) DEVICE — ENDO TROCAR SLEEVE KII Z-THREADED 05X100MM CTS02

## (undated) DEVICE — SPONGE LAP 18X18" X8435

## (undated) DEVICE — ESU GROUND PAD ADULT W/CORD E7507

## (undated) DEVICE — SYSTEM CLEARIFY VISUALIZATION 21-345

## (undated) DEVICE — SU VICRYL 3-0 SH 27" J316H

## (undated) DEVICE — SU VICRYL 0 TIE 12X18" J906G

## (undated) DEVICE — SOL WATER IRRIG 1000ML BOTTLE 2F7114

## (undated) DEVICE — SU PLAIN 6-0 G-1DA 18" 770G

## (undated) DEVICE — GOWN IMPERVIOUS SPECIALTY XLG/XLONG 32474

## (undated) DEVICE — SUCTION CANISTER MEDIVAC LINER 3000ML W/LID 65651-530

## (undated) DEVICE — ENDO TROCAR FIRST ENTRY KII FIOS Z-THRD 11X100MM CTF33

## (undated) DEVICE — EYE PREP BETADINE 5% SOLUTION 30ML 0065-0411-30

## (undated) RX ORDER — FENTANYL CITRATE 50 UG/ML
INJECTION, SOLUTION INTRAMUSCULAR; INTRAVENOUS
Status: DISPENSED
Start: 2019-01-19

## (undated) RX ORDER — PROPOFOL 10 MG/ML
INJECTION, EMULSION INTRAVENOUS
Status: DISPENSED
Start: 2019-01-20

## (undated) RX ORDER — GLYCOPYRROLATE 0.2 MG/ML
INJECTION, SOLUTION INTRAMUSCULAR; INTRAVENOUS
Status: DISPENSED
Start: 2019-01-20

## (undated) RX ORDER — NEOSTIGMINE METHYLSULFATE 1 MG/ML
VIAL (ML) INJECTION
Status: DISPENSED
Start: 2019-01-20

## (undated) RX ORDER — VECURONIUM BROMIDE 1 MG/ML
INJECTION, POWDER, LYOPHILIZED, FOR SOLUTION INTRAVENOUS
Status: DISPENSED
Start: 2019-01-20

## (undated) RX ORDER — BUPIVACAINE HYDROCHLORIDE AND EPINEPHRINE 5; 5 MG/ML; UG/ML
INJECTION, SOLUTION EPIDURAL; INTRACAUDAL; PERINEURAL
Status: DISPENSED
Start: 2019-01-19

## (undated) RX ORDER — FENTANYL CITRATE 50 UG/ML
INJECTION, SOLUTION INTRAMUSCULAR; INTRAVENOUS
Status: DISPENSED
Start: 2019-01-20

## (undated) RX ORDER — LIDOCAINE HYDROCHLORIDE 20 MG/ML
INJECTION, SOLUTION EPIDURAL; INFILTRATION; INTRACAUDAL; PERINEURAL
Status: DISPENSED
Start: 2019-01-19

## (undated) RX ORDER — ONDANSETRON 2 MG/ML
INJECTION INTRAMUSCULAR; INTRAVENOUS
Status: DISPENSED
Start: 2019-01-19

## (undated) RX ORDER — PROPOFOL 10 MG/ML
INJECTION, EMULSION INTRAVENOUS
Status: DISPENSED
Start: 2019-01-19

## (undated) RX ORDER — DEXAMETHASONE SODIUM PHOSPHATE 4 MG/ML
INJECTION, SOLUTION INTRA-ARTICULAR; INTRALESIONAL; INTRAMUSCULAR; INTRAVENOUS; SOFT TISSUE
Status: DISPENSED
Start: 2019-01-19

## (undated) RX ORDER — BUPIVACAINE HYDROCHLORIDE 2.5 MG/ML
INJECTION, SOLUTION EPIDURAL; INFILTRATION; INTRACAUDAL
Status: DISPENSED
Start: 2019-01-19

## (undated) RX ORDER — HYDROMORPHONE HYDROCHLORIDE 1 MG/ML
INJECTION, SOLUTION INTRAMUSCULAR; INTRAVENOUS; SUBCUTANEOUS
Status: DISPENSED
Start: 2019-01-20

## (undated) RX ORDER — PROPOFOL 10 MG/ML
INJECTION, EMULSION INTRAVENOUS
Status: DISPENSED
Start: 2023-02-17

## (undated) RX ORDER — ONDANSETRON 2 MG/ML
INJECTION INTRAMUSCULAR; INTRAVENOUS
Status: DISPENSED
Start: 2023-02-17

## (undated) RX ORDER — EPINEPHRINE 1 MG/ML
INJECTION, SOLUTION INTRAMUSCULAR; SUBCUTANEOUS
Status: DISPENSED
Start: 2019-01-19